# Patient Record
Sex: MALE | Race: WHITE | NOT HISPANIC OR LATINO
[De-identification: names, ages, dates, MRNs, and addresses within clinical notes are randomized per-mention and may not be internally consistent; named-entity substitution may affect disease eponyms.]

---

## 2018-10-09 NOTE — ASU PATIENT PROFILE, ADULT - PMH
Atrial fibrillation    Diabetes  pre diabetic  Hypercholesteremia    Hyperthyroidism    Rheumatoid arthritis

## 2018-10-10 ENCOUNTER — RESULT REVIEW (OUTPATIENT)
Age: 58
End: 2018-10-10

## 2018-10-10 ENCOUNTER — OUTPATIENT (OUTPATIENT)
Dept: OUTPATIENT SERVICES | Facility: HOSPITAL | Age: 58
LOS: 1 days | Discharge: HOME | End: 2018-10-10

## 2018-10-10 VITALS
DIASTOLIC BLOOD PRESSURE: 66 MMHG | SYSTOLIC BLOOD PRESSURE: 130 MMHG | OXYGEN SATURATION: 98 % | HEART RATE: 68 BPM | RESPIRATION RATE: 20 BRPM

## 2018-10-10 VITALS
WEIGHT: 315 LBS | DIASTOLIC BLOOD PRESSURE: 85 MMHG | HEIGHT: 75 IN | TEMPERATURE: 98 F | HEART RATE: 66 BPM | RESPIRATION RATE: 20 BRPM | SYSTOLIC BLOOD PRESSURE: 142 MMHG

## 2018-10-10 DIAGNOSIS — Z98.890 OTHER SPECIFIED POSTPROCEDURAL STATES: Chronic | ICD-10-CM

## 2018-10-10 NOTE — H&P PST ADULT - ASSESSMENT
57 yo M Hx of DM, HTN, DL RA, AFib here for EGD and colonoscopy (last colonoscopy in 2006).  Plan:  EGD  Colonoscopy

## 2018-10-11 LAB
SURGICAL PATHOLOGY STUDY: SIGNIFICANT CHANGE UP
SURGICAL PATHOLOGY STUDY: SIGNIFICANT CHANGE UP

## 2018-10-15 DIAGNOSIS — K57.30 DIVERTICULOSIS OF LARGE INTESTINE WITHOUT PERFORATION OR ABSCESS WITHOUT BLEEDING: ICD-10-CM

## 2018-10-15 DIAGNOSIS — E11.9 TYPE 2 DIABETES MELLITUS WITHOUT COMPLICATIONS: ICD-10-CM

## 2018-10-15 DIAGNOSIS — K22.70 BARRETT'S ESOPHAGUS WITHOUT DYSPLASIA: ICD-10-CM

## 2018-10-15 DIAGNOSIS — Z79.84 LONG TERM (CURRENT) USE OF ORAL HYPOGLYCEMIC DRUGS: ICD-10-CM

## 2018-10-15 DIAGNOSIS — Z12.11 ENCOUNTER FOR SCREENING FOR MALIGNANT NEOPLASM OF COLON: ICD-10-CM

## 2018-10-15 DIAGNOSIS — K29.50 UNSPECIFIED CHRONIC GASTRITIS WITHOUT BLEEDING: ICD-10-CM

## 2018-10-15 DIAGNOSIS — I48.91 UNSPECIFIED ATRIAL FIBRILLATION: ICD-10-CM

## 2018-10-15 DIAGNOSIS — K64.8 OTHER HEMORRHOIDS: ICD-10-CM

## 2018-10-15 DIAGNOSIS — Z79.52 LONG TERM (CURRENT) USE OF SYSTEMIC STEROIDS: ICD-10-CM

## 2018-10-15 DIAGNOSIS — M06.9 RHEUMATOID ARTHRITIS, UNSPECIFIED: ICD-10-CM

## 2018-10-15 DIAGNOSIS — K21.9 GASTRO-ESOPHAGEAL REFLUX DISEASE WITHOUT ESOPHAGITIS: ICD-10-CM

## 2018-10-15 DIAGNOSIS — I10 ESSENTIAL (PRIMARY) HYPERTENSION: ICD-10-CM

## 2018-10-15 DIAGNOSIS — E05.90 THYROTOXICOSIS, UNSPECIFIED WITHOUT THYROTOXIC CRISIS OR STORM: ICD-10-CM

## 2018-10-15 DIAGNOSIS — K20.9 ESOPHAGITIS, UNSPECIFIED: ICD-10-CM

## 2018-10-15 DIAGNOSIS — D12.4 BENIGN NEOPLASM OF DESCENDING COLON: ICD-10-CM

## 2018-10-15 DIAGNOSIS — E78.00 PURE HYPERCHOLESTEROLEMIA, UNSPECIFIED: ICD-10-CM

## 2018-10-15 DIAGNOSIS — K62.1 RECTAL POLYP: ICD-10-CM

## 2018-10-15 DIAGNOSIS — K44.9 DIAPHRAGMATIC HERNIA WITHOUT OBSTRUCTION OR GANGRENE: ICD-10-CM

## 2018-11-11 ENCOUNTER — INPATIENT (INPATIENT)
Facility: HOSPITAL | Age: 58
LOS: 1 days | Discharge: HOME | End: 2018-11-13
Attending: INTERNAL MEDICINE | Admitting: INTERNAL MEDICINE
Payer: COMMERCIAL

## 2018-11-11 VITALS
TEMPERATURE: 97 F | HEART RATE: 67 BPM | OXYGEN SATURATION: 98 % | RESPIRATION RATE: 18 BRPM | SYSTOLIC BLOOD PRESSURE: 145 MMHG | DIASTOLIC BLOOD PRESSURE: 90 MMHG

## 2018-11-11 DIAGNOSIS — E05.90 THYROTOXICOSIS, UNSPECIFIED WITHOUT THYROTOXIC CRISIS OR STORM: ICD-10-CM

## 2018-11-11 DIAGNOSIS — M25.551 PAIN IN RIGHT HIP: ICD-10-CM

## 2018-11-11 DIAGNOSIS — M06.9 RHEUMATOID ARTHRITIS, UNSPECIFIED: ICD-10-CM

## 2018-11-11 DIAGNOSIS — Z98.890 OTHER SPECIFIED POSTPROCEDURAL STATES: Chronic | ICD-10-CM
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
## 2018-11-11 DIAGNOSIS — Z23 ENCOUNTER FOR IMMUNIZATION: ICD-10-CM

## 2018-11-11 DIAGNOSIS — E78.00 PURE HYPERCHOLESTEROLEMIA, UNSPECIFIED: ICD-10-CM

## 2018-11-11 DIAGNOSIS — R73.03 PREDIABETES: ICD-10-CM

## 2018-11-11 DIAGNOSIS — Z87.891 PERSONAL HISTORY OF NICOTINE DEPENDENCE: ICD-10-CM

## 2018-11-11 DIAGNOSIS — M70.61 TROCHANTERIC BURSITIS, RIGHT HIP: ICD-10-CM

## 2018-11-11 DIAGNOSIS — M16.31 UNILATERAL OSTEOARTHRITIS RESULTING FROM HIP DYSPLASIA, RIGHT HIP: ICD-10-CM
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
## 2018-11-11 DIAGNOSIS — I48.0 PAROXYSMAL ATRIAL FIBRILLATION: ICD-10-CM

## 2018-11-11 DIAGNOSIS — M76.01 GLUTEAL TENDINITIS, RIGHT HIP: ICD-10-CM

## 2018-11-11 DIAGNOSIS — S76.011A STRAIN OF MUSCLE, FASCIA AND TENDON OF RIGHT HIP, INITIAL ENCOUNTER: ICD-10-CM

## 2018-11-11 PROBLEM — I48.91 UNSPECIFIED ATRIAL FIBRILLATION: Chronic | Status: ACTIVE | Noted: 2018-10-10

## 2018-11-11 PROBLEM — E11.9 TYPE 2 DIABETES MELLITUS WITHOUT COMPLICATIONS: Chronic | Status: ACTIVE | Noted: 2018-10-10

## 2018-11-11 LAB
ALBUMIN SERPL ELPH-MCNC: 5 G/DL — SIGNIFICANT CHANGE UP (ref 3.5–5.2)
ALP SERPL-CCNC: 45 U/L — SIGNIFICANT CHANGE UP (ref 30–115)
ALT FLD-CCNC: 51 U/L — HIGH (ref 0–41)
ANION GAP SERPL CALC-SCNC: 20 MMOL/L — HIGH (ref 7–14)
AST SERPL-CCNC: 21 U/L — SIGNIFICANT CHANGE UP (ref 0–41)
BILIRUB SERPL-MCNC: 0.8 MG/DL — SIGNIFICANT CHANGE UP (ref 0.2–1.2)
BUN SERPL-MCNC: 18 MG/DL — SIGNIFICANT CHANGE UP (ref 10–20)
CALCIUM SERPL-MCNC: 9.8 MG/DL — SIGNIFICANT CHANGE UP (ref 8.5–10.1)
CHLORIDE SERPL-SCNC: 94 MMOL/L — LOW (ref 98–110)
CO2 SERPL-SCNC: 24 MMOL/L — SIGNIFICANT CHANGE UP (ref 17–32)
CREAT SERPL-MCNC: 0.8 MG/DL — SIGNIFICANT CHANGE UP (ref 0.7–1.5)
GLUCOSE BLDC GLUCOMTR-MCNC: 194 MG/DL — HIGH (ref 70–99)
GLUCOSE SERPL-MCNC: 112 MG/DL — HIGH (ref 70–99)
HCT VFR BLD CALC: 46.1 % — SIGNIFICANT CHANGE UP (ref 42–52)
HGB BLD-MCNC: 16.8 G/DL — SIGNIFICANT CHANGE UP (ref 14–18)
MCHC RBC-ENTMCNC: 34.5 PG — HIGH (ref 27–31)
MCHC RBC-ENTMCNC: 36.4 G/DL — SIGNIFICANT CHANGE UP (ref 32–37)
MCV RBC AUTO: 94.7 FL — HIGH (ref 80–94)
NRBC # BLD: 0 /100 WBCS — SIGNIFICANT CHANGE UP (ref 0–0)
PLATELET # BLD AUTO: 181 K/UL — SIGNIFICANT CHANGE UP (ref 130–400)
POTASSIUM SERPL-MCNC: 3.8 MMOL/L — SIGNIFICANT CHANGE UP (ref 3.5–5)
POTASSIUM SERPL-SCNC: 3.8 MMOL/L — SIGNIFICANT CHANGE UP (ref 3.5–5)
PROT SERPL-MCNC: 7.3 G/DL — SIGNIFICANT CHANGE UP (ref 6–8)
RBC # BLD: 4.87 M/UL — SIGNIFICANT CHANGE UP (ref 4.7–6.1)
RBC # FLD: 14.3 % — SIGNIFICANT CHANGE UP (ref 11.5–14.5)
SODIUM SERPL-SCNC: 138 MMOL/L — SIGNIFICANT CHANGE UP (ref 135–146)
WBC # BLD: 8.95 K/UL — SIGNIFICANT CHANGE UP (ref 4.8–10.8)
WBC # FLD AUTO: 8.95 K/UL — SIGNIFICANT CHANGE UP (ref 4.8–10.8)

## 2018-11-11 PROCEDURE — 93971 EXTREMITY STUDY: CPT | Mod: 26

## 2018-11-11 RX ORDER — BUPROPION HYDROCHLORIDE 150 MG/1
300 TABLET, EXTENDED RELEASE ORAL
Qty: 0 | Refills: 0 | COMMUNITY

## 2018-11-11 RX ORDER — CYCLOBENZAPRINE HYDROCHLORIDE 10 MG/1
10 TABLET, FILM COATED ORAL THREE TIMES A DAY
Qty: 0 | Refills: 0 | Status: DISCONTINUED | OUTPATIENT
Start: 2018-11-11 | End: 2018-11-13

## 2018-11-11 RX ORDER — BUPROPION HYDROCHLORIDE 150 MG/1
300 TABLET, EXTENDED RELEASE ORAL DAILY
Qty: 0 | Refills: 0 | Status: DISCONTINUED | OUTPATIENT
Start: 2018-11-11 | End: 2018-11-13

## 2018-11-11 RX ORDER — CHLORHEXIDINE GLUCONATE 213 G/1000ML
1 SOLUTION TOPICAL
Qty: 0 | Refills: 0 | Status: DISCONTINUED | OUTPATIENT
Start: 2018-11-11 | End: 2018-11-13

## 2018-11-11 RX ORDER — TOCILIZUMAB 20 MG/ML
0 INJECTION, SOLUTION, CONCENTRATE INTRAVENOUS
Qty: 0 | Refills: 0 | COMMUNITY

## 2018-11-11 RX ORDER — FOLIC ACID 0.8 MG
1 TABLET ORAL
Qty: 0 | Refills: 0 | Status: DISCONTINUED | OUTPATIENT
Start: 2018-11-11 | End: 2018-11-13

## 2018-11-11 RX ORDER — FOLIC ACID 0.8 MG
1 TABLET ORAL
Qty: 0 | Refills: 0 | COMMUNITY

## 2018-11-11 RX ORDER — EMPAGLIFLOZIN, METFORMIN HYDROCHLORIDE 10; 1000 MG/1; MG/1
1 TABLET, EXTENDED RELEASE ORAL
Qty: 0 | Refills: 0 | COMMUNITY

## 2018-11-11 RX ORDER — ENOXAPARIN SODIUM 100 MG/ML
40 INJECTION SUBCUTANEOUS EVERY 24 HOURS
Qty: 0 | Refills: 0 | Status: DISCONTINUED | OUTPATIENT
Start: 2018-11-11 | End: 2018-11-13

## 2018-11-11 RX ORDER — TIZANIDINE 4 MG/1
1 TABLET ORAL
Qty: 21 | Refills: 0 | OUTPATIENT
Start: 2018-11-11 | End: 2018-11-17

## 2018-11-11 RX ORDER — ESOMEPRAZOLE MAGNESIUM 40 MG/1
0 CAPSULE, DELAYED RELEASE ORAL
Qty: 0 | Refills: 0 | COMMUNITY

## 2018-11-11 RX ORDER — OXYCODONE AND ACETAMINOPHEN 5; 325 MG/1; MG/1
1 TABLET ORAL ONCE
Qty: 0 | Refills: 0 | Status: DISCONTINUED | OUTPATIENT
Start: 2018-11-11 | End: 2018-11-11

## 2018-11-11 RX ORDER — LEVOTHYROXINE SODIUM 125 MCG
75 TABLET ORAL DAILY
Qty: 0 | Refills: 0 | Status: DISCONTINUED | OUTPATIENT
Start: 2018-11-11 | End: 2018-11-13

## 2018-11-11 RX ORDER — LEVOTHYROXINE SODIUM 125 MCG
1 TABLET ORAL
Qty: 0 | Refills: 0 | COMMUNITY

## 2018-11-11 RX ORDER — ATORVASTATIN CALCIUM 80 MG/1
0 TABLET, FILM COATED ORAL
Qty: 0 | Refills: 0 | COMMUNITY

## 2018-11-11 RX ORDER — INFLUENZA VIRUS VACCINE 15; 15; 15; 15 UG/.5ML; UG/.5ML; UG/.5ML; UG/.5ML
0.5 SUSPENSION INTRAMUSCULAR ONCE
Qty: 0 | Refills: 0 | Status: COMPLETED | OUTPATIENT
Start: 2018-11-11 | End: 2018-11-13

## 2018-11-11 RX ORDER — IBUPROFEN 200 MG
0 TABLET ORAL
Qty: 0 | Refills: 0 | COMMUNITY

## 2018-11-11 RX ORDER — KETOROLAC TROMETHAMINE 30 MG/ML
15 SYRINGE (ML) INJECTION EVERY 8 HOURS
Qty: 0 | Refills: 0 | Status: DISCONTINUED | OUTPATIENT
Start: 2018-11-11 | End: 2018-11-13

## 2018-11-11 RX ORDER — DILTIAZEM HCL 120 MG
360 CAPSULE, EXT RELEASE 24 HR ORAL DAILY
Qty: 0 | Refills: 0 | Status: DISCONTINUED | OUTPATIENT
Start: 2018-11-12 | End: 2018-11-13

## 2018-11-11 RX ORDER — KETOROLAC TROMETHAMINE 30 MG/ML
15 SYRINGE (ML) INJECTION ONCE
Qty: 0 | Refills: 0 | Status: DISCONTINUED | OUTPATIENT
Start: 2018-11-11 | End: 2018-11-11

## 2018-11-11 RX ORDER — METHOTREXATE 2.5 MG/1
0 TABLET ORAL
Qty: 0 | Refills: 0 | COMMUNITY

## 2018-11-11 RX ORDER — IBUPROFEN 200 MG
800 TABLET ORAL ONCE
Qty: 0 | Refills: 0 | Status: DISCONTINUED | OUTPATIENT
Start: 2018-11-11 | End: 2018-11-11

## 2018-11-11 RX ORDER — ATORVASTATIN CALCIUM 80 MG/1
20 TABLET, FILM COATED ORAL AT BEDTIME
Qty: 0 | Refills: 0 | Status: DISCONTINUED | OUTPATIENT
Start: 2018-11-11 | End: 2018-11-13

## 2018-11-11 RX ORDER — PANTOPRAZOLE SODIUM 20 MG/1
40 TABLET, DELAYED RELEASE ORAL
Qty: 0 | Refills: 0 | Status: DISCONTINUED | OUTPATIENT
Start: 2018-11-11 | End: 2018-11-13

## 2018-11-11 RX ORDER — METHOCARBAMOL 500 MG/1
1000 TABLET, FILM COATED ORAL ONCE
Qty: 0 | Refills: 0 | Status: COMPLETED | OUTPATIENT
Start: 2018-11-11 | End: 2018-11-11

## 2018-11-11 RX ORDER — IBUPROFEN 200 MG
1 TABLET ORAL
Qty: 21 | Refills: 0 | OUTPATIENT
Start: 2018-11-11 | End: 2018-11-17

## 2018-11-11 RX ADMIN — OXYCODONE AND ACETAMINOPHEN 1 TABLET(S): 5; 325 TABLET ORAL at 15:37

## 2018-11-11 RX ADMIN — Medication 15 MILLIGRAM(S): at 18:15

## 2018-11-11 RX ADMIN — Medication 15 MILLIGRAM(S): at 22:14

## 2018-11-11 RX ADMIN — METHOCARBAMOL 1000 MILLIGRAM(S): 500 TABLET, FILM COATED ORAL at 15:37

## 2018-11-11 RX ADMIN — Medication 15 MILLIGRAM(S): at 23:00

## 2018-11-11 NOTE — H&P ADULT - HISTORY OF PRESENT ILLNESS
The patient is a 58-year-old male with a PMH of HTN, type II DM, DLD, A-Fib, RA, hyperthyroidism who presented The patient is a 58-year-old male with a PMH of HTN, prediabetes, DLD, A-Fib (no AC), RA, and hyperthyroidism who presented with a 1-day history of right groin pain.  He reports the pain started suddenly while he was trying to walk.  He described the pain as a stabbing sensation and denied any radiation of pain.  He was able to ambulate despite experiencing pain when trying to do so.  Prior to onset of pain, the patient reports visiting a wooded area but denied sustaining any trauma.  Otherwise, he denied fever, chills, fall, abdominal pain, difficulty urinating or with bowel movement.  In the ED he was treated with pain medications and muscle relaxers.  However, he was unable to ambulate secondary to severe pain on lateral motion of the right leg. The patient is a 58-year-old male with a PMH of prediabetes, DLD, A-Fib (no AC), RA, and hyperthyroidism s/p GORDON who presented with a 1-day history of right groin pain.  He reports the pain started suddenly while he was trying to walk.  He described the pain as a stabbing sensation and denied any radiation of pain.  He was able to ambulate despite experiencing pain when trying to do so.  Prior to onset of pain, the patient reports visiting a wooded area but denied sustaining any trauma.  Otherwise, he denied fever, chills, fall, abdominal pain, difficulty urinating or with bowel movement.  In the ED he was treated with pain medications and muscle relaxers.  However, he was unable to ambulate secondary to severe pain on lateral motion of the right leg.

## 2018-11-11 NOTE — ED PROVIDER NOTE - CARE PLAN
Principal Discharge DX:	Groin strain Principal Discharge DX:	Groin strain  Secondary Diagnosis:	Poor tolerance for ambulation

## 2018-11-11 NOTE — ED PROVIDER NOTE - CARE PROVIDER_API CALL
George Rodriguez (MD), Orthopaedic Surgery  [facility] [place] [address]  Phone: [phone]  Fax: [phone]

## 2018-11-11 NOTE — ED PROVIDER NOTE - ATTENDING CONTRIBUTION TO CARE
The patient is a 58-year-old male with a PMH of prediabetes, DLD, A-Fib (no AC), RA, and hyperthyroidism s/p GORDON here for eval right groin pain. pt  reports 1 day of right groin pain. pain noticed yesterday. over the weekend the pt reports that he was [place]. this however is a usual activity for pt. pain is not associated with testicular pain, urinary sx's, abdominal pain, fever, chills. The pt denies any falls or misstep. Yesterday while driving home, pt had to lift his leg up by pulling on his pants since he had so much pain.   on exam pt no distress, [id number] soft nt/nd, ttp right groin, no hip ttp, pain with minimal rom right hip DNVI. impression right groin pain, normal testicular exam by PA, pt in ed eating meatball sandwhich with no abd ttp, dvt study prelim neg, xray with no fracture. after mult meds pt unable to ambulate in ed. will admit

## 2018-11-11 NOTE — ED PROVIDER NOTE - NS ED ROS FT
Constitutional: (-) fever, (-) chills, (-) recent illness,   Eyes: (-) pain  Cardiovascular: (-) chest pain, (-) syncope  Respiratory: (-) cough, (-) shortness of breath, (-) dyspnea,   Gastrointestinal: (-) vomiting, (-) diarrhea, (-)nausea,   Musculoskeletal: (-) neck pain, (-) back pain, (-) joint pain,(+) right groin pain (+)limitation of movement  Integumentary: (-) rash, (-) edema, (-) bruises, (-)color changes, (-)lumps,   Neurological: (-) headache,, (-) dizziness, (-) tingling, (-)numbness  Peripheral Vascular: (-) leg swelling, (-) color changes  : (-)frequency, (-)dysuria,  (-) hematuria, (-) testicular pain  All: (-) pruritis

## 2018-11-11 NOTE — H&P ADULT - NSHPPHYSICALEXAM_GEN_ALL_CORE
Vital Signs Last 24 Hrs  T(C): 36.4 (11 Nov 2018 15:51), Max: 36.4 (11 Nov 2018 15:51)  T(F): 97.5 (11 Nov 2018 15:51), Max: 97.5 (11 Nov 2018 15:51)  HR: 91 (11 Nov 2018 15:51) (67 - 91)  BP: 132/66 (11 Nov 2018 15:51) (132/66 - 145/90)  BP(mean): --  RR: 18 (11 Nov 2018 15:51) (18 - 18)  SpO2: 98% (11 Nov 2018 14:41) (98% - 98%)    Physical Exam:    -     General :     -      HEENT:    -      Cardiac:    -      Pulm:    -      GI:    -      Musculoskeletal:    -      Neuro: Vital Signs Last 24 Hrs  T(C): 36.4 (11 Nov 2018 15:51), Max: 36.4 (11 Nov 2018 15:51)  T(F): 97.5 (11 Nov 2018 15:51), Max: 97.5 (11 Nov 2018 15:51)  HR: 91 (11 Nov 2018 15:51) (67 - 91)  BP: 132/66 (11 Nov 2018 15:51) (132/66 - 145/90)  BP(mean): --  RR: 18 (11 Nov 2018 15:51) (18 - 18)  SpO2: 98% (11 Nov 2018 14:41) (98% - 98%)    Physical Exam:    -     General : Alert, awake and in mild-to-moderate painful distress when moving RLE    -      HEENT: normocephalic    -      Cardiac: RRR, normal S1/S2    -      Pulm: CTA B/L    -      GI: abdomen soft, non-tender    -      Musculoskeletal: mild tenderness over right groin area without erythema; lateral ROM of RLE limited secondary to pain    -      Neuro: AAO x3

## 2018-11-11 NOTE — ED PROVIDER NOTE - NSFOLLOWUPCLINICS_GEN_ALL_ED_FT
[facility]  Rehabilitation  [address] [place] [address]  Phone: [phone]  Fax:   Follow Up Time:

## 2018-11-11 NOTE — H&P ADULT - ASSESSMENT
Assessment:  A 58y Male     Plan:  1. Assessment:  A 58y Male with a PMH of prediabetes, DLD, A-Fib (no AC), RA, and hyperthyroidism s/p GORDON who presented with a 1-day history of right groin pain.     Plan:  1. Right groin pain  - DDx include muscle/tendon strain vs. trochanteric bursitis vs. hip OA  - x-ray of hip: No evidence of acute fracture or dislocation. Mild degenerative changes of the right hip.   - LE venous duplex reportedly negative for DVT; f/u official result  - continue bedrest for now; apply ice to affected area  - continue pain control as tolerated  - PT/rehab consult pending  - consider orthopedics evaluation for further evaluation    2. Prediabetes  - hold oral hypoglycemic medications  - monitor FS and start insulin if FS persistently >180    3. Paroxysmal A-Fib  - patient denies being on anticoagulants or antiplatelets  - continue rate control    4. DLD  - continue statin    5. RA  - patient on daily Prednisone and weekly MTX and Actembra  - continue Folic acid  - f/u with rheumatology as outpatient    6. Hyperthyroidism s/p GORDON  - continue Synthroid    7. DVT and GI prophylaxis  - LMWH and PPI    8. Disposition  - anticipate discharge home once medically stable

## 2018-11-11 NOTE — ED ADULT NURSE NOTE - NSIMPLEMENTINTERV_GEN_ALL_ED
Implemented All Fall with Harm Risk Interventions:  [place] to call system. Call bell, personal items and telephone within reach. Instruct patient to call for assistance. Room bathroom lighting operational. Non-slip footwear when patient is off stretcher. Physically safe environment: no spills, clutter or unnecessary equipment. Stretcher in lowest position, wheels locked, appropriate side rails in place. Provide visual cue, wrist band, yellow gown, etc. Monitor gait and stability. Monitor for mental status changes and reorient to person, place, and time. Review medications for side effects contributing to fall risk. Reinforce activity limits and safety measures with patient and family. Provide visual clues: red socks.

## 2018-11-11 NOTE — ED PROVIDER NOTE - MEDICAL DECISION MAKING DETAILS
impression right groin pain, normal testicular exam by PA, pt in ed eating meatball sandwhich with no abd ttp, dvt study prelim neg, xray with no fracture. after mult meds pt unable to ambulate in ed. will admit

## 2018-11-11 NOTE — ED PROVIDER NOTE - PHYSICAL EXAMINATION
Physical Exam    Vital Signs: I have reviewed the initial vital signs.  Constitutional: well-nourished, obese male, appears stated age, no acute distress  Cardiovascular: S1 and S2, regular rate, regular rhythm, well-perfused extremities, radial pulses equal and 2+  Respiratory: unlabored respiratory effort, clear to auscultation bilaterally no wheezing, rales and rhonchi  Gastrointestinal: soft, non-tender abdomen, no pulsatile mass, normal bowl sounds  Musculoskeletal: supple neck, no lower extremity edema, no midline tenderness. TTP in right groin, no hernia appreciated   Integumentary: warm, dry, no rash  Neurologic: awake, alert, cranial nerves II-XII grossly intact, extremities’ motor and sensory functions grossly intact  : Chaperoned with RN Agustín; normal testes, no pain to palpitation of testes, no hernia appreciated in inguinal canal.

## 2018-11-11 NOTE — ED PROVIDER NOTE - OBJECTIVE STATEMENT
59 yo male, PMH RA, HTN, afib no a/c or antiplt use, hyperthyroidism, presents to the ED for evaluation of right groin pain. Pt reports was hunting yesterday in [facility] and last night felt right groin pain. Pt reports that pain is made worse with movement, has taken motrin at home 4 hours PTA without relief, and pain does not radiate anywhere. At this time denies fever, chills, falls/trauma, chest pain, SOB, N/V/D, joint pain, back pain, neck pain, lower extremity swelling or pain, HA, numbness, tingling, dysuria, hematuria, flank pain, or testicular pain.

## 2018-11-11 NOTE — H&P ADULT - FAMILY HISTORY
Mother  Still living? Unknown  Family history of rheumatoid arthritis, Age at diagnosis: Age Unknown     Father  Still living? Unknown  Family history of coronary artery disease, Age at diagnosis: Age Unknown

## 2018-11-11 NOTE — ED ADULT NURSE NOTE - OBJECTIVE STATEMENT
Pt 57 y/o male c/o right hip pain, pt states it is painful at rest but extremely painful when moving.

## 2018-11-11 NOTE — H&P ADULT - ATTENDING COMMENTS
58y Male with a PMH of prediabetes, DLD, A-Fib (no AC), RA, and hyperthyroidism s/p GORDON who presented with a 1-day history of right groin pain.     Pt seen and examined- motion limited due to pain    Chart reviewed- agree with initial plan    orthopedics eval- to decide on MRI vs CT for better imaging    PT eval    analgesia prn    DVT proph

## 2018-11-11 NOTE — H&P ADULT - NSHPLABSRESULTS_GEN_ALL_CORE
Labs:                        16.8   8.95  )-----------( 181      ( 11 Nov 2018 19:20 )             46.1             11-11    138  |  94<L>  |  18  ----------------------------<  112<H>  3.8   |  24  |  0.8    Ca    9.8      11 Nov 2018 19:20    TPro  7.3  /  Alb  5.0  /  TBili  0.8  /  DBili  x   /  AST  21  /  ALT  51<H>  /  AlkPhos  45  11-11    LIVER FUNCTIONS - ( 11 Nov 2018 19:20 )  Alb: 5.0 g/dL / Pro: 7.3 g/dL / ALK PHOS: 45 U/L / ALT: 51 U/L / AST: 21 U/L / GGT: x

## 2018-11-11 NOTE — ED PROVIDER NOTE - PROGRESS NOTE DETAILS
Prelim read from vasc states negative for dvt On reassessment pt states in slightly less pain but any movement of right leg causes increase in pain. I have personally evaluated the patient myself and agree with fellow's plan. Results d/w patient and family and copies of results provided.  Pt instructed to return if any worsening symptoms or concerns.  They verbalize understanding. Pt is unable to ambulate due to pain. .pain with movement. no other symptoms. Pt unable to ambulate. WIll reassess labs (cbc cmp) and admit for inability to ambulate.

## 2018-11-12 LAB
ANION GAP SERPL CALC-SCNC: 19 MMOL/L — HIGH (ref 7–14)
BUN SERPL-MCNC: 21 MG/DL — HIGH (ref 10–20)
CALCIUM SERPL-MCNC: 9 MG/DL — SIGNIFICANT CHANGE UP (ref 8.5–10.1)
CHLORIDE SERPL-SCNC: 99 MMOL/L — SIGNIFICANT CHANGE UP (ref 98–110)
CO2 SERPL-SCNC: 23 MMOL/L — SIGNIFICANT CHANGE UP (ref 17–32)
CREAT SERPL-MCNC: 0.8 MG/DL — SIGNIFICANT CHANGE UP (ref 0.7–1.5)
GLUCOSE BLDC GLUCOMTR-MCNC: 149 MG/DL — HIGH (ref 70–99)
GLUCOSE BLDC GLUCOMTR-MCNC: 156 MG/DL — HIGH (ref 70–99)
GLUCOSE BLDC GLUCOMTR-MCNC: 204 MG/DL — HIGH (ref 70–99)
GLUCOSE BLDC GLUCOMTR-MCNC: 238 MG/DL — HIGH (ref 70–99)
GLUCOSE SERPL-MCNC: 133 MG/DL — HIGH (ref 70–99)
HCT VFR BLD CALC: 43.1 % — SIGNIFICANT CHANGE UP (ref 42–52)
HGB BLD-MCNC: 15 G/DL — SIGNIFICANT CHANGE UP (ref 14–18)
MAGNESIUM SERPL-MCNC: 2.1 MG/DL — SIGNIFICANT CHANGE UP (ref 1.8–2.4)
MCHC RBC-ENTMCNC: 33.3 PG — HIGH (ref 27–31)
MCHC RBC-ENTMCNC: 34.8 G/DL — SIGNIFICANT CHANGE UP (ref 32–37)
MCV RBC AUTO: 95.8 FL — HIGH (ref 80–94)
NRBC # BLD: 0 /100 WBCS — SIGNIFICANT CHANGE UP (ref 0–0)
PLATELET # BLD AUTO: 141 K/UL — SIGNIFICANT CHANGE UP (ref 130–400)
POTASSIUM SERPL-MCNC: 3.9 MMOL/L — SIGNIFICANT CHANGE UP (ref 3.5–5)
POTASSIUM SERPL-SCNC: 3.9 MMOL/L — SIGNIFICANT CHANGE UP (ref 3.5–5)
RBC # BLD: 4.5 M/UL — LOW (ref 4.7–6.1)
RBC # FLD: 14.4 % — SIGNIFICANT CHANGE UP (ref 11.5–14.5)
SODIUM SERPL-SCNC: 141 MMOL/L — SIGNIFICANT CHANGE UP (ref 135–146)
WBC # BLD: 6.09 K/UL — SIGNIFICANT CHANGE UP (ref 4.8–10.8)
WBC # FLD AUTO: 6.09 K/UL — SIGNIFICANT CHANGE UP (ref 4.8–10.8)

## 2018-11-12 RX ORDER — INSULIN GLARGINE 100 [IU]/ML
15 INJECTION, SOLUTION SUBCUTANEOUS AT BEDTIME
Qty: 0 | Refills: 0 | Status: DISCONTINUED | OUTPATIENT
Start: 2018-11-12 | End: 2018-11-13

## 2018-11-12 RX ORDER — INSULIN LISPRO 100/ML
VIAL (ML) SUBCUTANEOUS
Qty: 0 | Refills: 0 | Status: DISCONTINUED | OUTPATIENT
Start: 2018-11-12 | End: 2018-11-13

## 2018-11-12 RX ORDER — SODIUM CHLORIDE 9 MG/ML
1000 INJECTION, SOLUTION INTRAVENOUS
Qty: 0 | Refills: 0 | Status: DISCONTINUED | OUTPATIENT
Start: 2018-11-12 | End: 2018-11-13

## 2018-11-12 RX ORDER — INSULIN LISPRO 100/ML
5 VIAL (ML) SUBCUTANEOUS
Qty: 0 | Refills: 0 | Status: DISCONTINUED | OUTPATIENT
Start: 2018-11-12 | End: 2018-11-13

## 2018-11-12 RX ORDER — DEXTROSE 50 % IN WATER 50 %
25 SYRINGE (ML) INTRAVENOUS ONCE
Qty: 0 | Refills: 0 | Status: DISCONTINUED | OUTPATIENT
Start: 2018-11-12 | End: 2018-11-13

## 2018-11-12 RX ORDER — DEXTROSE 50 % IN WATER 50 %
12.5 SYRINGE (ML) INTRAVENOUS ONCE
Qty: 0 | Refills: 0 | Status: DISCONTINUED | OUTPATIENT
Start: 2018-11-12 | End: 2018-11-13

## 2018-11-12 RX ORDER — GLUCAGON INJECTION, SOLUTION 0.5 MG/.1ML
1 INJECTION, SOLUTION SUBCUTANEOUS ONCE
Qty: 0 | Refills: 0 | Status: DISCONTINUED | OUTPATIENT
Start: 2018-11-12 | End: 2018-11-13

## 2018-11-12 RX ORDER — DEXTROSE 50 % IN WATER 50 %
15 SYRINGE (ML) INTRAVENOUS ONCE
Qty: 0 | Refills: 0 | Status: DISCONTINUED | OUTPATIENT
Start: 2018-11-12 | End: 2018-11-13

## 2018-11-12 RX ADMIN — Medication 15 MILLIGRAM(S): at 21:47

## 2018-11-12 RX ADMIN — Medication 15 MILLIGRAM(S): at 15:17

## 2018-11-12 RX ADMIN — PANTOPRAZOLE SODIUM 40 MILLIGRAM(S): 20 TABLET, DELAYED RELEASE ORAL at 05:32

## 2018-11-12 RX ADMIN — Medication 5 MILLIGRAM(S): at 05:33

## 2018-11-12 RX ADMIN — ENOXAPARIN SODIUM 40 MILLIGRAM(S): 100 INJECTION SUBCUTANEOUS at 05:33

## 2018-11-12 RX ADMIN — Medication 1 MILLIGRAM(S): at 05:32

## 2018-11-12 RX ADMIN — CHLORHEXIDINE GLUCONATE 1 APPLICATION(S): 213 SOLUTION TOPICAL at 05:34

## 2018-11-12 RX ADMIN — INSULIN GLARGINE 15 UNIT(S): 100 INJECTION, SOLUTION SUBCUTANEOUS at 21:43

## 2018-11-12 RX ADMIN — Medication 75 MICROGRAM(S): at 05:32

## 2018-11-12 RX ADMIN — Medication 15 MILLIGRAM(S): at 13:01

## 2018-11-12 RX ADMIN — BUPROPION HYDROCHLORIDE 300 MILLIGRAM(S): 150 TABLET, EXTENDED RELEASE ORAL at 11:50

## 2018-11-12 RX ADMIN — Medication 15 MILLIGRAM(S): at 05:41

## 2018-11-12 RX ADMIN — ATORVASTATIN CALCIUM 20 MILLIGRAM(S): 80 TABLET, FILM COATED ORAL at 21:43

## 2018-11-12 RX ADMIN — CYCLOBENZAPRINE HYDROCHLORIDE 10 MILLIGRAM(S): 10 TABLET, FILM COATED ORAL at 02:45

## 2018-11-12 RX ADMIN — Medication 360 MILLIGRAM(S): at 05:33

## 2018-11-12 NOTE — CONSULT NOTE ADULT - SUBJECTIVE AND OBJECTIVE BOX
HPI:  The patient is a 58-year-old male with a PMH of prediabetes, DLD, A-Fib (no AC), RA, and hyperthyroidism s/p GORDON who presented with a 1-day history of right groin pain.  He reports the pain started suddenly while he was trying to walk.  He described the pain as a stabbing sensation and denied any radiation of pain.  Initially he was able to ambulate despite experiencing pain when trying to do so, however the pain progressed and was unable to ambulate.   Prior to onset of pain, the patient reports visiting a wooded area but denied sustaining any trauma or slipping in the mud.   Otherwise, he denied fever, chills, fall, abdominal pain, difficulty urinating or with bowel movement.  In the ED he was treated with pain medications and muscle relaxers.  However, he was unable to ambulate secondary to severe pain on lateral motion of the right leg. He describes the pain as being localized to the groin, there is trochanteric pain. The pain is better with Toradol.     PAST MEDICAL & SURGICAL HISTORY:  Diabetes: pre diabetic  Rheumatoid arthritis  Hyperthyroidism  Hypercholesteremia  Atrial fibrillation  H/O colonoscopy: 2006  History of umbilical hernia repair    MEDICATIONS  (STANDING):  atorvastatin 20 milliGRAM(s) Oral at bedtime  buPROPion XL . 300 milliGRAM(s) Oral daily  chlorhexidine 4% Liquid 1 Application(s) Topical <User Schedule>  dextrose 5%. 1000 milliLiter(s) (50 mL/Hr) IV Continuous <Continuous>  dextrose 50% Injectable 12.5 Gram(s) IV Push once  dextrose 50% Injectable 25 Gram(s) IV Push once  dextrose 50% Injectable 25 Gram(s) IV Push once  diltiazem    milliGRAM(s) Oral daily  enoxaparin Injectable 40 milliGRAM(s) SubCutaneous every 24 hours  folic acid 1 milliGRAM(s) Oral <User Schedule>  influenza   Vaccine 0.5 milliLiter(s) IntraMuscular once  insulin glargine Injectable (LANTUS) 15 Unit(s) SubCutaneous at bedtime  insulin lispro (HumaLOG) corrective regimen sliding scale   SubCutaneous three times a day before meals  insulin lispro Injectable (HumaLOG) 5 Unit(s) SubCutaneous three times a day before meals  levothyroxine 75 MICROGram(s) Oral daily  pantoprazole    Tablet 40 milliGRAM(s) Oral before breakfast  predniSONE   Tablet 5 milliGRAM(s) Oral daily    MEDICATIONS  (PRN):  cyclobenzaprine 10 milliGRAM(s) Oral three times a day PRN Muscle Spasm  dextrose 40% Gel 15 Gram(s) Oral once PRN Blood Glucose LESS THAN 70 milliGRAM(s)/deciliter  glucagon  Injectable 1 milliGRAM(s) IntraMuscular once PRN Glucose LESS THAN 70 milligrams/deciliter  ketorolac   Injectable 15 milliGRAM(s) IV Push every 8 hours PRN Severe Pain (7 - 10)      Vital Signs Last 24 Hrs  T(C): 36.1 (12 Nov 2018 07:30), Max: 36.6 (11 Nov 2018 22:08)  T(F): 96.9 (12 Nov 2018 07:30), Max: 97.9 (11 Nov 2018 22:08)  HR: 71 (12 Nov 2018 07:30) (71 - 79)  BP: 133/78 (12 Nov 2018 07:30) (112/54 - 146/82)  BP(mean): --  RR: 19 (12 Nov 2018 07:30) (18 - 19)  SpO2: 95% (11 Nov 2018 21:25) (95% - 95%)                          15.0   6.09  )-----------( 141      ( 12 Nov 2018 05:34 )             43.1     11-12    141  |  99  |  21<H>  ----------------------------<  133<H>  3.9   |  23  |  0.8    Ca    9.0      12 Nov 2018 05:34  Mg     2.1     11-12    TPro  7.3  /  Alb  5.0  /  TBili  0.8  /  DBili  x   /  AST  21  /  ALT  51<H>  /  AlkPhos  45  11-11      < from: Xray Hip 2-3 Views, Right (11.11.18 @ 16:18) >    EXAM:  XR HIP 2-3V RT            PROCEDURE DATE:  11/11/2018      INTERPRETATION:  Clinical History / Reason for exam:  Right hip pain.    Technique: 3 views of the right hip.    Comparison: None.    Findings/  Impression:    No evidence of acute fracture or dislocation. Mild degenerative changes   of the right hip. 2 mm radiodense fragment is noted to overlie the right   medial thigh soft tissues, which may be external to the patient.       ESTEFANI LEE M.D., RESIDENT RADIOLOGIST  This document has been electronically signed.  PROMISE IVRGEN M.D., ATTENDING RADIOLOGIST        This document has been electronically signed. Nov 11 2018  5:34PM    < end of copied text >    PHYSICAL EXAM:    The patient is awake, alert and cooperative, in NAD.     RLE: no tenderness to palpation over the greater troch area, some ttp over groin area. Unable to SLR on his own secondary to the pain, can passively raise leg with endpoint secondary to pain, + groin pain on log roll, no appreciable pain on axial loading. no knee swelling, FROM of the knee, calves soft nttp, nvi       A/P: Acute onset of right groin pain , xrays read as negative, patient reports getting CT scan however no report available yet and unable to view images secondary to PACS problem. The patient is feeling better on NSAIDs. Will discuss with attending and follow up the CT scan.
HPI:  The patient is a 58-year-old male with a PMH of prediabetes, DLD, A-Fib (no AC), RA, and hyperthyroidism s/p GORDON who presented with a 1-day history of right groin pain.  He reports the pain started suddenly while he was trying to walk.  He described the pain as a stabbing sensation and denied any radiation of pain.  He was able to ambulate despite experiencing pain when trying to do so.  Prior to onset of pain, the patient reports visiting a wooded area but denied sustaining any trauma.  Otherwise, he denied fever, chills, fall, abdominal pain, difficulty urinating or with bowel movement.  In the ED he was treated with pain medications and muscle relaxers.  However, he was unable to ambulate secondary to severe pain on lateral motion of the right leg. (11 Nov 2018 20:26). xray negative for fx.      PAST MEDICAL & SURGICAL HISTORY:  Diabetes: pre diabetic  Rheumatoid arthritis  Hyperthyroidism  Hypercholesteremia  Atrial fibrillation  H/O colonoscopy: 2006  History of umbilical hernia repair      Hospital Course:    TODAY'S SUBJECTIVE & REVIEW OF SYMPTOMS:     Constitutional WNL   Cardio WNL   Resp WNL   GI WNL  Heme WNL  Endo WNL  Skin WNL  MSK right groin pain  Neuro WNL  Cognitive WNL  Psych WNL      MEDICATIONS  (STANDING):  atorvastatin 20 milliGRAM(s) Oral at bedtime  buPROPion XL . 300 milliGRAM(s) Oral daily  chlorhexidine 4% Liquid 1 Application(s) Topical <User Schedule>  diltiazem    milliGRAM(s) Oral daily  enoxaparin Injectable 40 milliGRAM(s) SubCutaneous every 24 hours  folic acid 1 milliGRAM(s) Oral <User Schedule>  influenza   Vaccine 0.5 milliLiter(s) IntraMuscular once  levothyroxine 75 MICROGram(s) Oral daily  pantoprazole    Tablet 40 milliGRAM(s) Oral before breakfast  predniSONE   Tablet 5 milliGRAM(s) Oral daily    MEDICATIONS  (PRN):  cyclobenzaprine 10 milliGRAM(s) Oral three times a day PRN Muscle Spasm  ketorolac   Injectable 15 milliGRAM(s) IV Push every 8 hours PRN Severe Pain (7 - 10)      FAMILY HISTORY:  Family history of coronary artery disease (Father)  Family history of rheumatoid arthritis (Mother)      Allergies    No Known Allergies    Intolerances        SOCIAL HISTORY:    [  ] Etoh  [  ] Smoking  [  ] Substance abuse     Home Environment:  [  ] Home Alone  [ x ] Lives with Family  [  ] Home Health Aid    Dwelling:  [  ] Apartment  [x  ] Private House  [  ] Adult Home  [  ] Skilled Nursing Facility      [  ] Short Term  [  ] Long Term  [x  ] Stairs       Elevator [  ]    FUNCTIONAL STATUS PTA: (Check all that apply)  Ambulation: [ xx  ]Independent    [  ] Dependent     [  ] Non-Ambulatory  Assistive Device: [  ] SA Cane  [  ]  Q Cane  [  ] Walker  [  ]  Wheelchair  ADL : [x  ] Independent  [  ]  Dependent       Vital Signs Last 24 Hrs  T(C): 36.1 (12 Nov 2018 07:30), Max: 36.6 (11 Nov 2018 22:08)  T(F): 96.9 (12 Nov 2018 07:30), Max: 97.9 (11 Nov 2018 22:08)  HR: 71 (12 Nov 2018 07:30) (67 - 91)  BP: 133/78 (12 Nov 2018 07:30) (112/54 - 146/82)  BP(mean): --  RR: 19 (12 Nov 2018 07:30) (18 - 19)  SpO2: 95% (11 Nov 2018 21:25) (95% - 98%)      PHYSICAL EXAM: Alert & Oriented X3  GENERAL: NAD, well-groomed, well-developed  HEAD:  Atraumatic, Normocephalic  CHEST/LUNG: Clear   HEART: S1S2+  ABDOMEN: Soft, Nontender  EXTREMITIES:  no calf tenderness    NERVOUS SYSTEM:  Cranial Nerves 2-12 intact [  ] Abnormal  [  ]  ROM: WFL all extremities [  ]  Abnormal [x  ]limited rle  Motor Strength: WFL all extremities  [  ]  Abnormal [x  ]limited rle  Sensation: intact to light touch [x  ] Abnormal [  ]  Reflexes: Symmetric [  ]  Abnormal [  ]    FUNCTIONAL STATUS:  Bed Mobility: Independent [  ]  Supervision [  ]  Needs Assistance [ x ]  N/A [  ]  Transfers: Independent [  ]  Supervision [  ]  Needs Assistance [ x ]  N/A [  ]   Ambulation: Independent [  ]  Supervision [  ]  Needs Assistance [x  ]  N/A [  ]  ADL: Independent [  ] Requires Assistance [  ] N/A [  ]      LABS:                        15.0   6.09  )-----------( 141      ( 12 Nov 2018 05:34 )             43.1     11-12    141  |  99  |  21<H>  ----------------------------<  133<H>  3.9   |  23  |  0.8    Ca    9.0      12 Nov 2018 05:34  Mg     2.1     11-12    TPro  7.3  /  Alb  5.0  /  TBili  0.8  /  DBili  x   /  AST  21  /  ALT  51<H>  /  AlkPhos  45  11-11          RADIOLOGY & ADDITIONAL STUDIES:    Assesment:

## 2018-11-12 NOTE — PROGRESS NOTE ADULT - ASSESSMENT
________________________________________________________________________________  DAILY PROGRESS NOTE:    ================== SUBJECTIVE ==================    NIKOLAY CLEVELAND  /   58y  /  Male  /  MRN#: [id number]  Patient is a 58y old Male who presents with a chief complaint of Right groin pain (12 Nov 2018 09:32)  Currently admitted to medicine with the primary diagnosis of Groin strain      HOSPITAL DAY: 1d     OVERNIGHT EVENTS: None    TODAY: Patient was seen this morning at bedside. Currently, the patient reports no complaints     Review of systems is otherwise negative.    =================== OBJECTIVE ===================    VITAL SIGNS: Last 24 Hours  T(C): 36.1 (12 Nov 2018 07:30), Max: 36.6 (11 Nov 2018 22:08)  T(F): 96.9 (12 Nov 2018 07:30), Max: 97.9 (11 Nov 2018 22:08)  HR: 71 (12 Nov 2018 07:30) (67 - 91)  BP: 133/78 (12 Nov 2018 07:30) (112/54 - 146/82)  BP(mean): --  RR: 19 (12 Nov 2018 07:30) (18 - 19)  SpO2: 95% (11 Nov 2018 21:25) (95% - 98%)    11-11-18 @ 07:01  -  11-12-18 @ 07:00  --------------------------------------------------------  IN: 0 mL / OUT: 300 mL / NET: -300 mL      PHYSICAL EXAM:  GENERAL: NAD, well-developed  CHEST/LUNG: Clear to auscultation bilaterally; No wheeze  HEART: Regular rate and rhythm; No murmurs, rubs, or gallops  ABDOMEN: Soft, Nontender, Nondistended; Bowel sounds present  EXTREMITIES:  2+ Peripheral Pulses, No clubbing, cyanosis, or edema  PSYCH: AAOx3  NEUROLOGY: non-focal  SKIN: No rashes or lesions    ===================== LABS =====================                        15.0   6.09  )-----------( 141      ( 12 Nov 2018 05:34 )             43.1     11-12    141  |  99  |  21<H>  ----------------------------<  133<H>  3.9   |  23  |  0.8    Ca    9.0      12 Nov 2018 05:34  Mg     2.1     11-12    TPro  7.3  /  Alb  5.0  /  TBili  0.8  /  DBili  x   /  AST  21  /  ALT  51<H>  /  AlkPhos  45  11-11      ================== IMAGING TO DATE ==================    < from: Xray Hip 2-3 Views, Right (11.11.18 @ 16:18) >  No evidence of acute fracture or dislocation. Mild degenerative changes   of the right hip. 2 mm radiodense fragment is noted to overlie the right   medial thigh soft tissues, which may be external to the patient.     < end of copied text >      ================== ALLERGIES ===================  No Known Allergies    ==================== MEDS =====================  atorvastatin 20 milliGRAM(s) Oral at bedtime  buPROPion XL . 300 milliGRAM(s) Oral daily  chlorhexidine 4% Liquid 1 Application(s) Topical <User Schedule>  diltiazem    milliGRAM(s) Oral daily  enoxaparin Injectable 40 milliGRAM(s) SubCutaneous every 24 hours  folic acid 1 milliGRAM(s) Oral <User Schedule>  influenza   Vaccine 0.5 milliLiter(s) IntraMuscular once  levothyroxine 75 MICROGram(s) Oral daily  pantoprazole    Tablet 40 milliGRAM(s) Oral before breakfast  predniSONE   Tablet 5 milliGRAM(s) Oral daily    PRN MEDICATIONS  cyclobenzaprine 10 milliGRAM(s) Oral three times a day PRN  ketorolac   Injectable 15 milliGRAM(s) IV Push every 8 hours PRN    ================= ASSESS/PLAN ==================  Patient is a 58y old Male who presents with a chief complaint of Right groin pain (12 Nov 2018 09:32)  Currently admitted to medicine with the primary diagnosis of Groin strain      PLAN  A 58y Male with a PMH of prediabetes, DLD, A-Fib (no AC), RA, and hyperthyroidism s/p GORDON who presented with a 1-day history of right groin pain.     Plan:  #Right groin pain  - DDx include muscle/tendon strain vs. trochanteric bursitis vs. hip OA  - x-ray of hip: No evidence of acute fracture or dislocation. Mild degenerative changes of the right hip.   - LE venous duplex negative for DVT  - continue bedrest for now; apply ice to affected area  - continue pain control as tolerated  - PT/rehab consult pending  - Ortho consult placed, in the meantime ordered a pelvic CT non contrast     #Prediabetes  - hold oral hypoglycemic medications  - monitor FS and start insulin if FS persistently >180    #Paroxysmal A-Fib  - patient denies being on anticoagulants or antiplatelets  - continue rate control    #DLD  - continue statin    #RA  - patient on daily Prednisone and weekly MTX and Actembra  - continue Folic acid  - f/u with rheumatology as outpatient    #Hyperthyroidism s/p GORDON  - continue Synthroid    GI PPX: Pantoprozole   DVT PPX: Lovenox    DIET:  () Regular  /  (X) Cardiac  / () Renal  /  () Diabetic  /  () Fluid restriction  /   () NPO  ACTIVITY:  () Ad Alissa  /  (X) Advance as Tolerated  /  () Bed Rest  /  () Fall Precaution  /  () Seizure precaution    ================= PRESENT TODAY ==================    1-Kong Catheter: No  Indication:  2-IV access:      A)Peripheral: Yes      Indication: IV medications       B)Midline: No      Indication      C)PICC line: No      Indication:      D)Central Line: No       Indication:   3-IV Fluids: No  Indication:  4-Oxygen: Room Air Sat:     ================= DISPOSITION ==================    Patient to be discharged when condition(s) optimized.            Discharge to:  (X) Home  /  () SNF  /  () Hospice  /  () Other    ================= CODE STATUS =================                  (X) FULL CODE     |     () DNR     |     () DNI

## 2018-11-12 NOTE — CONSULT NOTE ADULT - ASSESSMENT
IMPRESSION: Rehab of gait dysfunction / right groin pain    PRECAUTIONS: [  ] Cardiac  [  ] Respiratory  [  ] Seizures [  ] Contact Isolation  [  ] Droplet Isolation  [  ] Other    Weight Bearing Status:     RECOMMENDATION:    Out of Bed to Chair     DVT/Decubiti Prophylaxis    REHAB PLAN:     [  x ] Bedside P/T 3-5 times a week   [   ]   Bedside O/T  2-3 times a week             [   ] No Rehab Therapy Indicated                   [   ]  Speech Therapy   Conditioning/ROM                                    ADL  Bed Mobility                                               Conditioning/ROM  Transfers                                                     Bed Mobility  Sitting /Standing Balance                         Transfers                                        Gait Training                                               Sitting/Standing Balance  Stair Training [   ]Applicable                    Home equipment Eval                                                                        Splinting  [   ] Only      GOALS:   ADL   [   ]   Independent                    Transfers  [ x  ] Independent                          Ambulation  [ x  ] Independent     [ x   ] With device                            [   ]  CG                                                         [   ]  CG                                                                  [   ] CG                            [    ] Min A                                                   [   ] Min A                                                              [   ] Min  A          DISCHARGE PLAN:   [   ]  Good candidate for Intensive Rehabilitation/Hospital based                                             Will tolerate 3hrs Intensive Rehab Daily                                       [ x   ]  Short Term Rehab in Skilled Nursing Facility                                vs       [ x   ]  Home with Outpatient or VN services                                         [    ]  Possible Candidate for Intensive Hospital based Rehab

## 2018-11-13 ENCOUNTER — TRANSCRIPTION ENCOUNTER (OUTPATIENT)
Age: 58
End: 2018-11-13

## 2018-11-13 VITALS — TEMPERATURE: 98 F | HEART RATE: 77 BPM | SYSTOLIC BLOOD PRESSURE: 132 MMHG | DIASTOLIC BLOOD PRESSURE: 65 MMHG

## 2018-11-13 LAB
ESTIMATED AVERAGE GLUCOSE: 189 MG/DL — HIGH (ref 68–114)
GLUCOSE BLDC GLUCOMTR-MCNC: 198 MG/DL — HIGH (ref 70–99)
GLUCOSE BLDC GLUCOMTR-MCNC: 269 MG/DL — HIGH (ref 70–99)
HBA1C BLD-MCNC: 8.2 % — HIGH (ref 4–5.6)
HBV CORE AB SER-ACNC: SIGNIFICANT CHANGE UP
HBV CORE IGM SER-ACNC: SIGNIFICANT CHANGE UP
HBV SURFACE AB SER-ACNC: SIGNIFICANT CHANGE UP
HBV SURFACE AG SER-ACNC: SIGNIFICANT CHANGE UP
HCV AB S/CO SERPL IA: 0.12 S/CO — SIGNIFICANT CHANGE UP
HCV AB SERPL-IMP: SIGNIFICANT CHANGE UP

## 2018-11-13 RX ADMIN — PANTOPRAZOLE SODIUM 40 MILLIGRAM(S): 20 TABLET, DELAYED RELEASE ORAL at 08:40

## 2018-11-13 RX ADMIN — Medication 360 MILLIGRAM(S): at 05:59

## 2018-11-13 RX ADMIN — Medication 5 UNIT(S): at 08:42

## 2018-11-13 RX ADMIN — Medication 5 MILLIGRAM(S): at 05:58

## 2018-11-13 RX ADMIN — Medication 5 UNIT(S): at 12:36

## 2018-11-13 RX ADMIN — Medication 6: at 12:35

## 2018-11-13 RX ADMIN — Medication 15 MILLIGRAM(S): at 05:57

## 2018-11-13 RX ADMIN — BUPROPION HYDROCHLORIDE 300 MILLIGRAM(S): 150 TABLET, EXTENDED RELEASE ORAL at 12:38

## 2018-11-13 RX ADMIN — Medication 1 MILLIGRAM(S): at 05:58

## 2018-11-13 RX ADMIN — Medication 75 MICROGRAM(S): at 05:58

## 2018-11-13 RX ADMIN — Medication 2: at 08:42

## 2018-11-13 RX ADMIN — INFLUENZA VIRUS VACCINE 0.5 MILLILITER(S): 15; 15; 15; 15 SUSPENSION INTRAMUSCULAR at 15:28

## 2018-11-13 NOTE — DISCHARGE NOTE ADULT - PROVIDER TOKENS
TOKJEWEL:'[id number]',CARLOS:'[id number]' CARLOS:'[id number]',CARLOS:'[id number]'

## 2018-11-13 NOTE — PHYSICAL THERAPY INITIAL EVALUATION ADULT - GENERAL OBSERVATIONS, REHAB EVAL
[id number] am Chart reviewed. Pt. seen seated at edge of bed; in DEBBIE, c/o right hip/groin pain but verbalized improving due to intake of Tramadol.

## 2018-11-13 NOTE — DISCHARGE NOTE ADULT - MEDICATION SUMMARY - MEDICATIONS TO TAKE
I will START or STAY ON the medications listed below when I get home from the hospital:    predniSONE  -- 5 milligram(s) by mouth once a day  -- Indication: For Rheumatoid Arthritis     Cardizem  -- 360 milligram(s) by mouth once a day  -- Indication: For Atrial fibrillation     Synjardy 12.5 mg-500 mg oral tablet  -- 1 tab(s) by mouth 2 times a day  -- Indication: For Diabetes     Lipitor  -- orally once a day  -- Indication: For Hyperlipidemia     methotrexate  -- Indication: For Rheumatoid Arthritis     Actemra 162 mg/0.9 mL subcutaneous solution  -- subcutaneous once a week  -- Indication: For Rheumatoid Arthritis     NexIUM  -- orally once a day  -- Indication: For Gastric Protection     Wellbutrin  -- 300 milligram(s) by mouth once a day  -- Indication: For Smoking Cessation     Synthroid 75 mcg (0.075 mg) oral tablet  -- 1 tab(s) by mouth once a day  -- Indication: For Hypothyroidism     folic acid 1 mg oral tablet  -- 1 tab(s) by mouth 2 times a day  -- Indication: For Health Maintenance

## 2018-11-13 NOTE — DISCHARGE NOTE ADULT - CARE PLAN
Principal Discharge DX:	Groin strain  Goal:	Resolution  Assessment and plan of treatment:	The right groin and hip pain you had are likely the result of muscle strain due to the mechanical strain.   A CT scan done showed some degenerative bone disease but did not show any fractures.   There is some fluid around the right hip however no intervention as required at the moment.   Please follow up with your primary care physician and orthopedics as an outpatient  Secondary Diagnosis:	Atrial fibrillation  Goal:	Stability  Assessment and plan of treatment:	Stable. Continue taking the Cardizem as prescribed and follow up with your primary care physician and cardiologist after discharge  Secondary Diagnosis:	Diabetes  Goal:	Stability  Assessment and plan of treatment:	Stable. Continue taking the medications as prescribed and follow up with your primary care physician and after discharge  Secondary Diagnosis:	Rheumatoid arthritis  Goal:	Stability  Assessment and plan of treatment:	Stable.  Continue taking the medications as prescribed and follow up with your primary care physician and after discharge Principal Discharge DX:	Groin strain  Goal:	Resolution  Assessment and plan of treatment:	The right groin and hip pain you had are likely the result of muscle strain due to the mechanical strain.   A CT scan done showed some degenerative bone disease but did not show any fractures.   There is some fluid around the right hip however no intervention as required at the moment.   Please follow up with your primary care physician and orthopedics as an outpatient  Secondary Diagnosis:	Atrial fibrillation  Goal:	Stability  Assessment and plan of treatment:	Stable. Continue taking the Cardizem as prescribed and follow up with your primary care physician and cardiologist after discharge  Secondary Diagnosis:	Diabetes  Goal:	Stability  Assessment and plan of treatment:	Stable. Continue taking the medications as prescribed and follow up with your primary care physician and after discharge  Follow your blood sugars at home daily in the morning, before going to bed and before and after meals. Try to keep a target glucose around 100  Secondary Diagnosis:	Rheumatoid arthritis  Goal:	Stability  Assessment and plan of treatment:	Stable.  Continue taking the medications as prescribed and follow up with your primary care physician and after discharge Principal Discharge DX:	Groin strain  Goal:	Resolution  Assessment and plan of treatment:	The right groin and hip pain you had are likely the result of muscle strain due to the mechanical strain.   A CT scan done showed some degenerative bone disease but did not show any fractures.   There is some fluid around the right hip however no intervention as required at the moment.   Please follow up with your primary care physician and orthopedics as an outpatient as you may need an MRI of the hip  Secondary Diagnosis:	Atrial fibrillation  Goal:	Stability  Assessment and plan of treatment:	Stable. Continue taking the Cardizem as prescribed and follow up with your primary care physician and cardiologist after discharge  Secondary Diagnosis:	Diabetes  Goal:	Stability  Assessment and plan of treatment:	Stable. Continue taking the medications as prescribed and follow up with your primary care physician and after discharge  Follow your blood sugars at home daily in the morning, before going to bed and before and after meals. Try to keep a target glucose around 100  Secondary Diagnosis:	Rheumatoid arthritis  Goal:	Stability  Assessment and plan of treatment:	Stable.  Continue taking the medications as prescribed and follow up with your primary care physician and after discharge

## 2018-11-13 NOTE — PROGRESS NOTE ADULT - SUBJECTIVE AND OBJECTIVE BOX
Patient was seen and examined. Spoke with RN. Chart reviewed.  still with pain in right hip  case discussed with ho/son    No events overnight.  Vital Signs Last 24 Hrs  T(F): 96.8 (13 Nov 2018 07:15), Max: 99.1 (12 Nov 2018 15:45)  HR: 68 (13 Nov 2018 07:15) (67 - 70)  BP: 115/59 (13 Nov 2018 07:15) (115/59 - 126/64)  SpO2: --  MEDICATIONS  (STANDING):  atorvastatin 20 milliGRAM(s) Oral at bedtime  buPROPion XL . 300 milliGRAM(s) Oral daily  chlorhexidine 4% Liquid 1 Application(s) Topical <User Schedule>  dextrose 5%. 1000 milliLiter(s) (50 mL/Hr) IV Continuous <Continuous>  dextrose 50% Injectable 12.5 Gram(s) IV Push once  dextrose 50% Injectable 25 Gram(s) IV Push once  dextrose 50% Injectable 25 Gram(s) IV Push once  diltiazem    milliGRAM(s) Oral daily  enoxaparin Injectable 40 milliGRAM(s) SubCutaneous every 24 hours  folic acid 1 milliGRAM(s) Oral <User Schedule>  influenza   Vaccine 0.5 milliLiter(s) IntraMuscular once  insulin glargine Injectable (LANTUS) 15 Unit(s) SubCutaneous at bedtime  insulin lispro (HumaLOG) corrective regimen sliding scale   SubCutaneous three times a day before meals  insulin lispro Injectable (HumaLOG) 5 Unit(s) SubCutaneous three times a day before meals  levothyroxine 75 MICROGram(s) Oral daily  pantoprazole    Tablet 40 milliGRAM(s) Oral before breakfast  predniSONE   Tablet 5 milliGRAM(s) Oral daily    MEDICATIONS  (PRN):  cyclobenzaprine 10 milliGRAM(s) Oral three times a day PRN Muscle Spasm  dextrose 40% Gel 15 Gram(s) Oral once PRN Blood Glucose LESS THAN 70 milliGRAM(s)/deciliter  glucagon  Injectable 1 milliGRAM(s) IntraMuscular once PRN Glucose LESS THAN 70 milligrams/deciliter  ketorolac   Injectable 15 milliGRAM(s) IV Push every 8 hours PRN Severe Pain (7 - 10)    Labs:                        15.0   6.09  )-----------( 141      ( 12 Nov 2018 05:34 )             43.1                         16.8   8.95  )-----------( 181      ( 11 Nov 2018 19:20 )             46.1     12 Nov 2018 05:34    141    |  99     |  21     ----------------------------<  133    3.9     |  23     |  0.8    11 Nov 2018 19:20    138    |  94     |  18     ----------------------------<  112    3.8     |  24     |  0.8      Ca    9.0        12 Nov 2018 05:34  Ca    9.8        11 Nov 2018 19:20  Mg     2.1       12 Nov 2018 05:34    TPro  7.3    /  Alb  5.0    /  TBili  0.8    /  DBili  x      /  AST  21     /  ALT  51     /  AlkPhos  45     11 Nov 2018 19:20            Radiology:    General: comfortable, NAD  Neurology: A&Ox3, nonfocal  Head:  Normocephalic, atraumatic  ENT:  Mucosa moist, no ulcerations  Neck:  Supple, no JVD,   Skin: no breakdowns  Resp: CTA B/L  CV: RRR, S1S2,   GI: Soft, NT, bowel sounds  MS: No edema, + peripheral pulses, FROM all 4 extremity

## 2018-11-13 NOTE — PROGRESS NOTE ADULT - ASSESSMENT
________________________________________________________________________________  DAILY PROGRESS NOTE:    ================== SUBJECTIVE ==================    NIKOLAY CLEVELAND  /   58y  /  Male  /  MRN#: [id number]  Patient is a 58y old Male who presents with a chief complaint of Right groin pain (12 Nov 2018 15:50)  Currently admitted to medicine with the primary diagnosis of Groin strain      HOSPITAL DAY: 2d     SUMMARY OF HOSPITAL COURSE TO DATE     OVERNIGHT EVENTS:     TODAY: Patient was seen this morning at bedside. Currently, the patient reports ....    Review of systems is otherwise negative.    =================== OBJECTIVE ===================    VITAL SIGNS: Last 24 Hours  T(C): 36 (13 Nov 2018 07:15), Max: 37.3 (12 Nov 2018 15:45)  T(F): 96.8 (13 Nov 2018 07:15), Max: 99.1 (12 Nov 2018 15:45)  HR: 68 (13 Nov 2018 07:15) (67 - 70)  BP: 115/59 (13 Nov 2018 07:15) (115/59 - 126/64)  BP(mean): --  RR: 19 (13 Nov 2018 07:15) (18 - 19)  SpO2: --    PHYSICAL EXAM:  GENERAL: NAD, well-developed  CHEST/LUNG: Clear to auscultation bilaterally; No wheeze  HEART: Regular rate and rhythm; No murmurs, rubs, or gallops  ABDOMEN: Soft, Nontender, Nondistended; Bowel sounds present  EXTREMITIES:  2+ Peripheral Pulses, No clubbing, cyanosis, or edema  PSYCH: AAOx3  NEUROLOGY: non-focal  SKIN: No rashes or lesions    ===================== LABS =====================                        15.0   6.09  )-----------( 141      ( 12 Nov 2018 05:34 )             43.1     11-12    141  |  99  |  21<H>  ----------------------------<  133<H>  3.9   |  23  |  0.8    Ca    9.0      12 Nov 2018 05:34  Mg     2.1     11-12    TPro  7.3  /  Alb  5.0  /  TBili  0.8  /  DBili  x   /  AST  21  /  ALT  51<H>  /  AlkPhos  45  11-11    ================== ALLERGIES ===================  No Known Allergies    ==================== MEDS =====================  atorvastatin 20 milliGRAM(s) Oral at bedtime  buPROPion XL . 300 milliGRAM(s) Oral daily  chlorhexidine 4% Liquid 1 Application(s) Topical <User Schedule>  dextrose 5%. 1000 milliLiter(s) IV Continuous <Continuous>  dextrose 50% Injectable 12.5 Gram(s) IV Push once  dextrose 50% Injectable 25 Gram(s) IV Push once  dextrose 50% Injectable 25 Gram(s) IV Push once  diltiazem    milliGRAM(s) Oral daily  enoxaparin Injectable 40 milliGRAM(s) SubCutaneous every 24 hours  folic acid 1 milliGRAM(s) Oral <User Schedule>  influenza   Vaccine 0.5 milliLiter(s) IntraMuscular once  insulin glargine Injectable (LANTUS) 15 Unit(s) SubCutaneous at bedtime  insulin lispro (HumaLOG) corrective regimen sliding scale   SubCutaneous three times a day before meals  insulin lispro Injectable (HumaLOG) 5 Unit(s) SubCutaneous three times a day before meals  levothyroxine 75 MICROGram(s) Oral daily  pantoprazole    Tablet 40 milliGRAM(s) Oral before breakfast  predniSONE   Tablet 5 milliGRAM(s) Oral daily    PRN MEDICATIONS  cyclobenzaprine 10 milliGRAM(s) Oral three times a day PRN  dextrose 40% Gel 15 Gram(s) Oral once PRN  glucagon  Injectable 1 milliGRAM(s) IntraMuscular once PRN  ketorolac   Injectable 15 milliGRAM(s) IV Push every 8 hours PRN    ================= ASSESS/PLAN ==================  Patient is a 58y old Male who presents with a chief complaint of Right groin pain (12 Nov 2018 09:32)  Currently admitted to medicine with the primary diagnosis of Groin strain      PLAN  A 58y Male with a PMH of prediabetes, DLD, A-Fib (no AC), RA, and hyperthyroidism s/p GORDON who presented with a 1-day history of right groin pain.     Plan:  #Right groin pain  - DDx include muscle/tendon strain vs. trochanteric bursitis vs. hip OA  - x-ray of hip: No evidence of acute fracture or dislocation. Mild degenerative changes of the right hip.   - LE venous duplex negative for DVT  - continue bedrest for now; apply ice to affected area  - continue pain control as tolerated  - PT/rehab consult pending  - CT scan shows DGD and effusion around the right hip but no fractures or hernias. Awaiting ortho recs.     #Prediabetes  - hold oral hypoglycemic medications  - monitor FS and start insulin if FS persistently >180    #Paroxysmal A-Fib  - patient denies being on anticoagulants or antiplatelets  - continue rate control    #DLD  - continue statin    #RA  - patient on daily Prednisone and weekly MTX and Actembra  - continue Folic acid  - f/u with rheumatology as outpatient    #Hyperthyroidism s/p GORDON  - continue Synthroid    GI PPX: Pantoprozole   DVT PPX: Lovenox    DIET:  () Regular  /  (X) Cardiac  / () Renal  /  () Diabetic  /  () Fluid restriction  /   () NPO  ACTIVITY:  () Ad Alissa  /  (X) Advance as Tolerated  /  () Bed Rest  /  () Fall Precaution  /  () Seizure precaution    ================= PRESENT TODAY ==================    1-Kong Catheter: No  Indication:  2-IV access:      A)Peripheral: Yes      Indication: IV medications       B)Midline: No      Indication      C)PICC line: No      Indication:      D)Central Line: No       Indication:   3-IV Fluids: No  Indication:  4-Oxygen: Room Air Sat:     ================= DISPOSITION ==================    Patient to be discharged when condition(s) optimized.            Discharge to:  (X) Home  /  () SNF  /  () Hospice  /  () Other    ================= CODE STATUS =================                  (X) FULL CODE     |     () DNR     |     () DNI ________________________________________________________________________________  DAILY PROGRESS NOTE:    ================== SUBJECTIVE ==================    NIKOLAY CLEVELAND  /   58y  /  Male  /  MRN#: [id number]  Patient is a 58y old Male who presents with a chief complaint of Right groin pain (12 Nov 2018 15:50)  Currently admitted to medicine with the primary diagnosis of Groin strain      HOSPITAL DAY: 2d     OVERNIGHT EVENTS: None    TODAY: Patient was seen this morning at bedside. Currently, the patient reports no complaints     Review of systems is otherwise negative.    =================== OBJECTIVE ===================    VITAL SIGNS: Last 24 Hours  T(C): 36 (13 Nov 2018 07:15), Max: 37.3 (12 Nov 2018 15:45)  T(F): 96.8 (13 Nov 2018 07:15), Max: 99.1 (12 Nov 2018 15:45)  HR: 68 (13 Nov 2018 07:15) (67 - 70)  BP: 115/59 (13 Nov 2018 07:15) (115/59 - 126/64)  BP(mean): --  RR: 19 (13 Nov 2018 07:15) (18 - 19)  SpO2: --    PHYSICAL EXAM:  GENERAL: NAD, well-developed  CHEST/LUNG: Clear to auscultation bilaterally; No wheeze  HEART: Regular rate and rhythm; No murmurs, rubs, or gallops  ABDOMEN: Soft, Nontender, Nondistended; Bowel sounds present  EXTREMITIES:  2+ Peripheral Pulses, No clubbing, cyanosis, or edema  PSYCH: AAOx3  NEUROLOGY: non-focal  SKIN: No rashes or lesions    ===================== LABS =====================                        15.0   6.09  )-----------( 141      ( 12 Nov 2018 05:34 )             43.1     11-12    141  |  99  |  21<H>  ----------------------------<  133<H>  3.9   |  23  |  0.8    Ca    9.0      12 Nov 2018 05:34  Mg     2.1     11-12    TPro  7.3  /  Alb  5.0  /  TBili  0.8  /  DBili  x   /  AST  21  /  ALT  51<H>  /  AlkPhos  45  11-11    ================== ALLERGIES ===================  No Known Allergies    ==================== MEDS =====================  atorvastatin 20 milliGRAM(s) Oral at bedtime  buPROPion XL . 300 milliGRAM(s) Oral daily  chlorhexidine 4% Liquid 1 Application(s) Topical <User Schedule>  dextrose 5%. 1000 milliLiter(s) IV Continuous <Continuous>  dextrose 50% Injectable 12.5 Gram(s) IV Push once  dextrose 50% Injectable 25 Gram(s) IV Push once  dextrose 50% Injectable 25 Gram(s) IV Push once  diltiazem    milliGRAM(s) Oral daily  enoxaparin Injectable 40 milliGRAM(s) SubCutaneous every 24 hours  folic acid 1 milliGRAM(s) Oral <User Schedule>  influenza   Vaccine 0.5 milliLiter(s) IntraMuscular once  insulin glargine Injectable (LANTUS) 15 Unit(s) SubCutaneous at bedtime  insulin lispro (HumaLOG) corrective regimen sliding scale   SubCutaneous three times a day before meals  insulin lispro Injectable (HumaLOG) 5 Unit(s) SubCutaneous three times a day before meals  levothyroxine 75 MICROGram(s) Oral daily  pantoprazole    Tablet 40 milliGRAM(s) Oral before breakfast  predniSONE   Tablet 5 milliGRAM(s) Oral daily    PRN MEDICATIONS  cyclobenzaprine 10 milliGRAM(s) Oral three times a day PRN  dextrose 40% Gel 15 Gram(s) Oral once PRN  glucagon  Injectable 1 milliGRAM(s) IntraMuscular once PRN  ketorolac   Injectable 15 milliGRAM(s) IV Push every 8 hours PRN    ================= ASSESS/PLAN ==================  Patient is a 58y old Male who presents with a chief complaint of Right groin pain (12 Nov 2018 09:32)  Currently admitted to medicine with the primary diagnosis of Groin strain      PLAN  A 58y Male with a PMH of prediabetes, DLD, A-Fib (no AC), RA, and hyperthyroidism s/p GORDON who presented with a 1-day history of right groin pain.     Plan:  #Right groin pain  - DDx include muscle/tendon strain vs. trochanteric bursitis vs. hip OA  - x-ray of hip: No evidence of acute fracture or dislocation. Mild degenerative changes of the right hip.   - LE venous duplex negative for DVT  - continue bedrest for now; apply ice to affected area  - continue pain control as tolerated  - PT/rehab consult pending  - CT scan shows DGD and effusion around the right hip but no fractures or hernias. Awaiting ortho recs.     #Prediabetes  - hold oral hypoglycemic medications  - monitor FS and start insulin if FS persistently >180    #Paroxysmal A-Fib  - patient denies being on anticoagulants or antiplatelets  - continue rate control    #DLD  - continue statin    #RA  - patient on daily Prednisone and weekly MTX and Actembra  - continue Folic acid  - f/u with rheumatology as outpatient    #Hyperthyroidism s/p GORDON  - continue Synthroid    GI PPX: Pantoprozole   DVT PPX: Lovenox    DIET:  () Regular  /  (X) Cardiac  / () Renal  /  () Diabetic  /  () Fluid restriction  /   () NPO  ACTIVITY:  () Ad Alissa  /  (X) Advance as Tolerated  /  () Bed Rest  /  () Fall Precaution  /  () Seizure precaution    ================= PRESENT TODAY ==================    1-Kong Catheter: No  Indication:  2-IV access:      A)Peripheral: Yes      Indication: IV medications       B)Midline: No      Indication      C)PICC line: No      Indication:      D)Central Line: No       Indication:   3-IV Fluids: No  Indication:  4-Oxygen: Room Air Sat:     ================= DISPOSITION ==================    Patient to be discharged when condition(s) optimized.            Discharge to:  (X) Home  /  () SNF  /  () Hospice  /  () Other    ================= CODE STATUS =================                  (X) FULL CODE     |     () DNR     |     () DNI

## 2018-11-13 NOTE — DISCHARGE NOTE ADULT - CARE PROVIDERS DIRECT ADDRESSES
,DirectAddress_Unknown,[email or web address] ,DirectAddress_Unknown,[email or web address]

## 2018-11-13 NOTE — DISCHARGE NOTE ADULT - INSTRUCTIONS
Follow a DASH diet: low in salt, low in cholesterol and low in fat but carbohydrate consistent dc home ,continue diabetes meds,check blood sugar level

## 2018-11-13 NOTE — PROGRESS NOTE ADULT - ASSESSMENT
A 58y Male with a PMH of prediabetes, DLD, A-Fib (no AC), RA, and hyperthyroidism s/p GORDON who presented with a 1-day history of right groin pain.     Plan:  #Right groin pain  - DDx include muscle/tendon strain vs. trochanteric bursitis vs. hip OA  - x-ray of hip: No evidence of acute fracture or dislocation. Mild degenerative changes of the right hip.   - LE venous duplex negative for DVT  - continue bedrest for now; apply ice to affected area  - continue pain control as tolerated  - PT/rehab consult pending  - CT scan shows DGD and effusion around the right hip but no fractures or hernias. Awaiting ortho recs.     #Prediabetes  - hold oral hypoglycemic medications  - monitor FS and start insulin if FS persistently >180    #Paroxysmal A-Fib  - patient denies being on anticoagulants or antiplatelets  - continue rate control    #DLD  - continue statin    #RA  - patient on daily Prednisone and weekly MTX and Actembra  - continue Folic acid  - f/u with rheumatology as outpatient    #Hyperthyroidism s/p GORDON  - continue Synthroid    GI PPX: Pantoprozole   DVT PPX: Lovenox

## 2018-11-13 NOTE — DISCHARGE NOTE ADULT - CARE PROVIDER_API CALL
Henri Ledesma (), Infectious Disease; Internal Medicine  [address] [place] [address]  Phone: [phone]  Fax: [phone]    Librado Andersen), Orthopaedic Surgery  [address] [place] [address]  Phone: [phone]  Fax: [phone] Henri Ledesma (), Infectious Disease; Internal Medicine  [address] [place] [address]  Phone: [phone]  Fax: [phone]    Parvez Antonio), Surgery  [address] [place] [address]  Phone: [phone]  Fax: [phone]

## 2018-11-13 NOTE — DISCHARGE NOTE ADULT - NS AS ACTIVITY OBS
Avoid heavy activities for the next week to 10 days however progressively return to your regular activities as tolerated

## 2018-11-13 NOTE — DISCHARGE NOTE ADULT - HOSPITAL COURSE
The patient is a 58-year-old male with a PMH of prediabetes, DLD, A-Fib (no AC), RA, and hyperthyroidism s/p GORDON who presented with a 1-day history of right groin pain.  He reports the pain started suddenly while he was trying to walk.  He described the pain as a stabbing sensation and denied any radiation of pain.  He was able to ambulate despite experiencing pain when trying to do so.  Prior to onset of pain, the patient reports visiting a wooded area but denied sustaining any trauma.  Otherwise, he denied fever, chills, fall, abdominal pain, difficulty urinating or with bowel movement.  In the ED he was treated with pain medications and muscle relaxers.  However, he was unable to ambulate secondary to severe pain on lateral motion of the right leg.    A xray done was negative for fracture and CT done showed degenerative disease, fluid around the right hip and no fractures.   Patient was seen by orthopedics and will follow up as an outpatient. Is medically cleared for discharge as he improved after a course of NSAIDs and is tolerating ambulation The patient is a 58-year-old male with a PMH of prediabetes, DLD, A-Fib (no AC), RA, and hyperthyroidism s/p GORDON who presented with a 1-day history of right groin pain.  He reports the pain started suddenly while he was trying to walk.  He described the pain as a stabbing sensation and denied any radiation of pain.  He was able to ambulate despite experiencing pain when trying to do so.  Prior to onset of pain, the patient reports visiting a wooded area but denied sustaining any trauma.  Otherwise, he denied fever, chills, fall, abdominal pain, difficulty urinating or with bowel movement.  In the ED he was treated with pain medications and muscle relaxers.  However, he was unable to ambulate secondary to severe pain on lateral motion of the right leg.    A xray done was negative for fracture and CT done showed degenerative disease, fluid around the right hip and no fractures.   Patient was seen by orthopedics and will follow up as an outpatient. Discussed the case this morning with attending who also spoke with the family members, and the patient is medically cleared for discharge as he improved after a course of NSAIDs and is tolerating ambulation with crushes. He will follow up with ortho and PMD as outpatient for possible MRI

## 2018-11-13 NOTE — DISCHARGE NOTE ADULT - PLAN OF CARE
Resolution The right groin and hip pain you had are likely the result of muscle strain due to the mechanical strain.   A CT scan done showed some degenerative bone disease but did not show any fractures.   There is some fluid around the right hip however no intervention as required at the moment.   Please follow up with your primary care physician and orthopedics as an outpatient Stability Stable. Continue taking the Cardizem as prescribed and follow up with your primary care physician and cardiologist after discharge Stable. Continue taking the medications as prescribed and follow up with your primary care physician and after discharge Stable.  Continue taking the medications as prescribed and follow up with your primary care physician and after discharge Stable. Continue taking the medications as prescribed and follow up with your primary care physician and after discharge  Follow your blood sugars at home daily in the morning, before going to bed and before and after meals. Try to keep a target glucose around 100 The right groin and hip pain you had are likely the result of muscle strain due to the mechanical strain.   A CT scan done showed some degenerative bone disease but did not show any fractures.   There is some fluid around the right hip however no intervention as required at the moment.   Please follow up with your primary care physician and orthopedics as an outpatient as you may need an MRI of the hip

## 2018-11-13 NOTE — DISCHARGE NOTE ADULT - PATIENT PORTAL LINK FT
You can access the [facility] Patient Portal, offered by [facility], by registering with the following website: [email or web address]

## 2019-01-29 PROBLEM — Z00.00 ENCOUNTER FOR PREVENTIVE HEALTH EXAMINATION: Status: ACTIVE | Noted: 2019-01-29

## 2019-01-31 ENCOUNTER — APPOINTMENT (OUTPATIENT)
Dept: CARDIOLOGY | Facility: CLINIC | Age: 59
End: 2019-01-31

## 2019-01-31 VITALS
HEIGHT: 75 IN | HEART RATE: 75 BPM | BODY MASS INDEX: 39.17 KG/M2 | DIASTOLIC BLOOD PRESSURE: 82 MMHG | SYSTOLIC BLOOD PRESSURE: 144 MMHG | WEIGHT: 315 LBS

## 2019-01-31 DIAGNOSIS — Z78.9 OTHER SPECIFIED HEALTH STATUS: ICD-10-CM

## 2019-01-31 DIAGNOSIS — Z87.891 PERSONAL HISTORY OF NICOTINE DEPENDENCE: ICD-10-CM

## 2019-01-31 NOTE — PHYSICAL EXAM
[General Appearance - Well Developed] : well developed [Normal Appearance] : normal appearance [Well Groomed] : well groomed [General Appearance - Well Nourished] : well nourished [No Deformities] : no deformities [General Appearance - In No Acute Distress] : no acute distress [Normal Conjunctiva] : the conjunctiva exhibited no abnormalities [Eyelids - No Xanthelasma] : the eyelids demonstrated no xanthelasmas [Normal Oral Mucosa] : normal oral mucosa [No Oral Pallor] : no oral pallor [No Oral Cyanosis] : no oral cyanosis [Normal Jugular Venous A Waves Present] : normal jugular venous A waves present [Normal Jugular Venous V Waves Present] : normal jugular venous V waves present [No Jugular Venous Ortiz A Waves] : no jugular venous ortiz A waves [Respiration, Rhythm And Depth] : normal respiratory rhythm and effort [Exaggerated Use Of Accessory Muscles For Inspiration] : no accessory muscle use [Auscultation Breath Sounds / Voice Sounds] : lungs were clear to auscultation bilaterally [Heart Rate And Rhythm] : heart rate and rhythm were normal [Heart Sounds] : normal S1 and S2 [Murmurs] : no murmurs present [Abdomen Soft] : soft [Abdomen Tenderness] : non-tender [Abdomen Mass (___ Cm)] : no abdominal mass palpated [Abnormal Walk] : normal gait [Gait - Sufficient For Exercise Testing] : the gait was sufficient for exercise testing [Nail Clubbing] : no clubbing of the fingernails [Cyanosis, Localized] : no localized cyanosis [Petechial Hemorrhages (___cm)] : no petechial hemorrhages [Skin Color & Pigmentation] : normal skin color and pigmentation [] : no rash [No Venous Stasis] : no venous stasis [Skin Lesions] : no skin lesions [No Skin Ulcers] : no skin ulcer [No Xanthoma] : no  xanthoma was observed [Oriented To Time, Place, And Person] : oriented to person, place, and time [Affect] : the affect was normal [Mood] : the mood was normal [No Anxiety] : not feeling anxious

## 2019-02-05 ENCOUNTER — FORM ENCOUNTER (OUTPATIENT)
Age: 59
End: 2019-02-05

## 2019-02-06 ENCOUNTER — OUTPATIENT (OUTPATIENT)
Dept: OUTPATIENT SERVICES | Facility: HOSPITAL | Age: 59
LOS: 1 days | Discharge: HOME | End: 2019-02-06

## 2019-02-06 DIAGNOSIS — Z98.890 OTHER SPECIFIED POSTPROCEDURAL STATES: Chronic | ICD-10-CM

## 2019-02-06 DIAGNOSIS — I10 ESSENTIAL (PRIMARY) HYPERTENSION: ICD-10-CM

## 2019-02-07 ENCOUNTER — APPOINTMENT (OUTPATIENT)
Dept: CARDIOLOGY | Facility: CLINIC | Age: 59
End: 2019-02-07

## 2019-02-11 ENCOUNTER — APPOINTMENT (OUTPATIENT)
Dept: CARDIOLOGY | Facility: CLINIC | Age: 59
End: 2019-02-11

## 2019-02-14 NOTE — DISCUSSION/SUMMARY
[FreeTextEntry1] : patient is cleared for bariatric surgery\par no further cardiac work up warranted\par no evidence of any active cv problems\par low risk surgery

## 2019-02-14 NOTE — HISTORY OF PRESENT ILLNESS
[FreeTextEntry1] : pre-op for bariatric surgery\par patient denies h/o exertional chest pain or dyspnea\par  no evidence of cad or ashd\par denies h/o angina, mi etc.\par risk factors for cad are htn and obesity\par  no h/o valvular or hypertensive heart disease\par no evidence of peripheral vascular disease, claudication, tia or cva\par denies exertional chest pain or sob\par no evidence of pvd, cva or tia\par denies palps or arrythmias\par no pre-syncope or syncope\par \par 2d echo done on 2/7/2019 was normal. ef of 55-60%\par nuclear exam was normal

## 2019-02-14 NOTE — REASON FOR VISIT
[Initial Evaluation] : an initial evaluation of [FreeTextEntry2] : pre-op for bariatric surgery at [facility]

## 2021-05-18 ENCOUNTER — APPOINTMENT (OUTPATIENT)
Dept: CARDIOLOGY | Facility: CLINIC | Age: 61
End: 2021-05-18
Payer: COMMERCIAL

## 2021-05-18 VITALS
TEMPERATURE: 97.7 F | DIASTOLIC BLOOD PRESSURE: 78 MMHG | WEIGHT: 289 LBS | SYSTOLIC BLOOD PRESSURE: 136 MMHG | BODY MASS INDEX: 35.93 KG/M2 | HEIGHT: 75 IN | HEART RATE: 57 BPM

## 2021-05-18 DIAGNOSIS — R73.03 PREDIABETES.: ICD-10-CM

## 2021-05-18 DIAGNOSIS — E03.9 HYPOTHYROIDISM, UNSPECIFIED: ICD-10-CM

## 2021-05-18 PROCEDURE — 99214 OFFICE O/P EST MOD 30 MIN: CPT

## 2021-05-18 PROCEDURE — 93000 ELECTROCARDIOGRAM COMPLETE: CPT

## 2021-05-18 PROCEDURE — 99072 ADDL SUPL MATRL&STAF TM PHE: CPT

## 2021-05-18 RX ORDER — FOLIC ACID 1 MG/1
1 TABLET ORAL TWICE DAILY
Refills: 0 | Status: DISCONTINUED | COMMUNITY
End: 2021-05-18

## 2021-05-18 NOTE — HISTORY OF PRESENT ILLNESS
[FreeTextEntry1] : pre-op for bariatric surgery\par patient denies h/o exertional chest pain or dyspnea\par  no evidence of cad or ashd\par denies h/o angina, mi etc.\par risk factors for cad are htn and obesity\par  no h/o valvular or hypertensive heart disease\par no evidence of peripheral vascular disease, claudication, tia or cva\par denies exertional chest pain or sob\par no evidence of pvd, cva or tia\par denies palps or arrythmias\par no pre-syncope or syncope\par \par 2d echo done on 2/7/2019 was normal. ef of 55-60%\par nuclear exam was normal\par \par s/p bariatric surgery \par doing well\par no cardiac complaints\par lost 100lbs\par will need echo

## 2021-05-18 NOTE — REASON FOR VISIT
[Initial Evaluation] : an initial evaluation of [FreeTextEntry2] : pre-op for bariatric surgery at [facility]

## 2021-06-22 ENCOUNTER — APPOINTMENT (OUTPATIENT)
Dept: CARDIOLOGY | Facility: CLINIC | Age: 61
End: 2021-06-22
Payer: COMMERCIAL

## 2021-06-22 DIAGNOSIS — Z01.810 ENCOUNTER FOR PREPROCEDURAL CARDIOVASCULAR EXAMINATION: ICD-10-CM

## 2021-06-22 PROCEDURE — 93306 TTE W/DOPPLER COMPLETE: CPT

## 2021-06-22 PROCEDURE — 99072 ADDL SUPL MATRL&STAF TM PHE: CPT

## 2021-06-29 PROBLEM — Z01.810 ENCOUNTER FOR PRE-OPERATIVE CARDIOVASCULAR CLEARANCE: Status: ACTIVE | Noted: 2019-01-31

## 2021-09-14 ENCOUNTER — APPOINTMENT (OUTPATIENT)
Dept: CARDIOLOGY | Facility: CLINIC | Age: 61
End: 2021-09-14
Payer: COMMERCIAL

## 2021-09-14 VITALS
TEMPERATURE: 97.3 F | HEIGHT: 75 IN | DIASTOLIC BLOOD PRESSURE: 64 MMHG | SYSTOLIC BLOOD PRESSURE: 128 MMHG | BODY MASS INDEX: 36.31 KG/M2 | WEIGHT: 292 LBS | HEART RATE: 50 BPM

## 2021-09-14 DIAGNOSIS — I10 ESSENTIAL (PRIMARY) HYPERTENSION: ICD-10-CM

## 2021-09-14 PROCEDURE — 99214 OFFICE O/P EST MOD 30 MIN: CPT

## 2021-09-14 PROCEDURE — 93000 ELECTROCARDIOGRAM COMPLETE: CPT

## 2021-09-14 RX ORDER — MULTIVITAMIN
TABLET ORAL
Refills: 0 | Status: ACTIVE | COMMUNITY

## 2021-09-14 RX ORDER — METHOTREXATE 25 MG/ML
INJECTION, SOLUTION INTRAMUSCULAR; INTRATHECAL; INTRAVENOUS; SUBCUTANEOUS
Refills: 0 | Status: ACTIVE | COMMUNITY

## 2021-09-14 RX ORDER — B-COMPLEX WITH VITAMIN C
TABLET ORAL
Refills: 0 | Status: ACTIVE | COMMUNITY

## 2021-09-14 RX ORDER — LEVOTHYROXINE SODIUM 0.07 MG/1
75 TABLET ORAL DAILY
Qty: 30 | Refills: 0 | Status: ACTIVE | COMMUNITY

## 2021-09-14 RX ORDER — DILTIAZEM HYDROCHLORIDE 360 MG/1
360 TABLET, EXTENDED RELEASE ORAL
Refills: 0 | Status: ACTIVE | COMMUNITY

## 2021-09-14 NOTE — REASON FOR VISIT
[Initial Evaluation] : an initial evaluation of [FreeTextEntry2] : pre-op for bariatric surgery at [facility]

## 2022-01-19 ENCOUNTER — APPOINTMENT (OUTPATIENT)
Dept: CARDIOLOGY | Facility: CLINIC | Age: 62
End: 2022-01-19

## 2022-01-19 ENCOUNTER — EMERGENCY (EMERGENCY)
Facility: HOSPITAL | Age: 62
LOS: 0 days | Discharge: HOME | End: 2022-01-19
Attending: EMERGENCY MEDICINE | Admitting: EMERGENCY MEDICINE
Payer: COMMERCIAL

## 2022-01-19 VITALS
HEIGHT: 75 IN | OXYGEN SATURATION: 99 % | TEMPERATURE: 97 F | HEART RATE: 60 BPM | DIASTOLIC BLOOD PRESSURE: 72 MMHG | RESPIRATION RATE: 18 BRPM | SYSTOLIC BLOOD PRESSURE: 156 MMHG
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
## 2022-01-19 DIAGNOSIS — S01.412A LACERATION WITHOUT FOREIGN BODY OF LEFT CHEEK AND TEMPOROMANDIBULAR AREA, INITIAL ENCOUNTER: ICD-10-CM

## 2022-01-19 DIAGNOSIS — Z23 ENCOUNTER FOR IMMUNIZATION: ICD-10-CM

## 2022-01-19 DIAGNOSIS — I48.91 UNSPECIFIED ATRIAL FIBRILLATION: ICD-10-CM
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
## 2022-01-19 DIAGNOSIS — E11.9 TYPE 2 DIABETES MELLITUS WITHOUT COMPLICATIONS: ICD-10-CM

## 2022-01-19 DIAGNOSIS — Z98.890 OTHER SPECIFIED POSTPROCEDURAL STATES: Chronic | ICD-10-CM

## 2022-01-19 DIAGNOSIS — E78.00 PURE HYPERCHOLESTEROLEMIA, UNSPECIFIED: ICD-10-CM
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
## 2022-01-19 PROCEDURE — 99284 EMERGENCY DEPT VISIT MOD MDM: CPT

## 2022-01-19 RX ORDER — TETANUS TOXOID, REDUCED DIPHTHERIA TOXOID AND ACELLULAR PERTUSSIS VACCINE, ADSORBED 5; 2.5; 8; 8; 2.5 [IU]/.5ML; [IU]/.5ML; UG/.5ML; UG/.5ML; UG/.5ML
0.5 SUSPENSION INTRAMUSCULAR ONCE
Refills: 0 | Status: COMPLETED | OUTPATIENT
Start: 2022-01-19 | End: 2022-01-19

## 2022-01-19 RX ADMIN — TETANUS TOXOID, REDUCED DIPHTHERIA TOXOID AND ACELLULAR PERTUSSIS VACCINE, ADSORBED 0.5 MILLILITER(S): 5; 2.5; 8; 8; 2.5 SUSPENSION INTRAMUSCULAR at 13:24

## 2022-01-19 NOTE — ED PROVIDER NOTE - CARE PROVIDER_API CALL
Galileo Salmeron)  Plastic Surgery  [address] [place] [address]  Phone: [phone]  Fax: [phone]  Scheduled Appointment: 01/24/2022

## 2022-01-19 NOTE — ED PROVIDER NOTE - PATIENT PORTAL LINK FT
You can access the FollowMyHealth Patient Portal offered by [facility] by registering at the following website: [email or web address]. By joining [employer]’s FollowMyHealth portal, you will also be able to view your health information using other applications (apps) compatible with our system.

## 2022-01-19 NOTE — ED ADULT NURSE NOTE - NSICDXFAMILYHX_GEN_ALL_CORE_FT
FAMILY HISTORY:  Father  Still living? Unknown  Family history of coronary artery disease, Age at diagnosis: Age Unknown    Mother  Still living? Unknown  Family history of rheumatoid arthritis, Age at diagnosis: Age Unknown

## 2022-01-19 NOTE — ED PROVIDER NOTE - NS ED ROS FT
Name band;
Review of Systems  Constitutional: (-)head trauma  Eyes/ENT: (-) blurry vision, (-) epistaxis  Cardiovascular: - LOC  Gastrointestinal: (-) vomiting, (-) nausea  Musculoskeletal: (-) neck pain, (-) back pain,   Integumentary: (+) lac  Neurological: (-) headache, (-) altered mental status

## 2022-01-19 NOTE — ED ADULT NURSE NOTE - NSICDXPASTMEDICALHX_GEN_ALL_CORE_FT
PAST MEDICAL HISTORY:  Atrial fibrillation     Diabetes pre diabetic    Hypercholesteremia     Hyperthyroidism     Rheumatoid arthritis

## 2022-01-19 NOTE — ED PROVIDER NOTE - OBJECTIVE STATEMENT
60 yo M presents to meet Dr. Salmeron for laceration repair to left cheek.  Pt states he was carrying a box with metal and cut his cheek. Needs Tetanus.

## 2022-01-19 NOTE — CONSULT NOTE ADULT - SUBJECTIVE AND OBJECTIVE BOX
Plastic: 61 y.o. man struck a piece of metal in his garage sustained a laceration to his cheek.    O/E: Alert. 3.0cm laceration left cheek, beveled. Lido 1%, 1.8cc. COMPLEX repair with debridement, 6-0 vicryl, 6-0 nylon. Dressed. Will check in 5 days. ER to give tetanus.

## 2022-01-19 NOTE — ED PROVIDER NOTE - PHYSICAL EXAMINATION
Vital Signs: I have reviewed the initial vital signs.  Constitutional: WDWN in nad.   Integumentary: + 3 cm laceration to left cheek  HEENT: NC/AT, PERRL  NECK: Supple, non-tender,   Musculoskeletal: FROM,   Neurologic: AAOx3,  good tone, equal strength, steady gait

## 2022-01-24 NOTE — CHART NOTE - NSCHARTNOTEFT_GEN_A_CORE
PACU ANESTHESIA ADMISSION NOTE      Procedure:   Post op diagnosis:      ____  Intubated  TV:______       Rate: ______      FiO2: ______    _x___  Patent Airway    _x___  Full return of protective reflexes    _x___  Full recovery from anesthesia / back to baseline status    Vitals:            T:                BP : 140/67               R:  18            Sat:   95            P:72      Mental Status:  _x___ Awake   _____ Alert   _____ Drowsy   _____ Sedated    Nausea/Vomiting:  _x___  NO       ______Yes,   See Post - Op Orders         Pain Scale (0-10):  __0___    Treatment: _x___ None    ____ See Post - Op/PCA Orders    Post - Operative Fluids:   __x__ Oral   ____ See Post - Op Orders    Plan: Discharge:   _x___Home       _____Floor     _____Critical Care    _____  Other:_________________    Comments:  No anesthesia issues or complications noted.  Discharge when criteria met.
Price (Do Not Change): 0.00
Detail Level: Simple
Instructions: This plan will send the code FBSE to the PM system.  DO NOT or CHANGE the price.

## 2023-05-31 NOTE — PHYSICAL THERAPY INITIAL EVALUATION ADULT - PREDICTED DURATION OF THERAPY (DAYS/WKS), PT EVAL
PT initial evaluation only Adjacent Tissue Transfer Text: The defect edges were debeveled with a #15 scalpel blade.  Given the location of the defect and the proximity to free margins an adjacent tissue transfer was deemed most appropriate.  Using a sterile surgical marker, an appropriate flap was drawn incorporating the defect and placing the expected incisions within the relaxed skin tension lines where possible.    The area thus outlined was incised deep to adipose tissue with a #15 scalpel blade.  The skin margins were undermined to an appropriate distance in all directions utilizing iris scissors.

## 2024-05-22 NOTE — DISCHARGE NOTE ADULT - NS AS DC FU INST LIST INST
Physical Therapy Visit    Visit Type: Daily Treatment Note  Visit: 5  Referring Provider: Madhav Reese MD  Medical Diagnosis (from order): M70.62 - Trochanteric bursitis of left hip     SUBJECTIVE                                                                                                               Patient shares she has been okay. Last night had aching in left thigh which progressed to numbness down the leg. Lasted for about 5-10 minutes. Shares lately she just hasn't felt stable. Feels a lot more stable with the cane.     Feels like hip isn't as tender to the touch as before cortisone shot.     Pain / Symptoms  - Pain rating (out of 10): Current: 2       OBJECTIVE                                                                                                                             Standing Balance  (MARGIE = base of support)  Firm Surface: Single Leg     - Left, Eyes Open (sec): 3             Treatment    Neuromuscular Electrical Stimulation (NMES)  - Muscle Group(s): Left quadriceps - medial distal and lateral proximal pad placement   - Pulse Rate: 38  - On (sec): 10 - Off (sec):  30  - Intensity: patient tolerance  Duration: 10 minutes      Therapeutic Exercise  Nustep L2 for LE mobility / endurance while taking subjective report, 5min     Resisted dorsiflexion with red theraband, x15 each side   Resisted standing terminal knee extension with red theraband, x20 LLE     Adjusted SPC height     Neuromuscular Re-Education  NMES for left quadriceps recruitment - see modalities section for parameters   While performing the following:   -Quad set, 10sec hold x 8   -Short arc quad over red bolster, 10sec hold x 8     Skilled input: verbal instruction/cues, tactile instruction/cues, posture correction, facilitation, inhibition and demonstration    Writer verbally educated and received verbal consent for hand placement, positioning of patient, and techniques to be performed today from patient for clothing  adjustments for techniques, therapist position for techniques and hand placement and palpation for techniques as described above and how they are pertinent to the patient's plan of care.  Home Exercise Program  Access Code: [email or web address]  URL: [email or web address]  Date: 05/01/2024  Prepared by: Rina Baumann    Exercises  - Seated Hip Abduction with Resistance  - 1 x daily - 7 x weekly - 1-2 sets - 10 reps - 10 hold  - Supine Bridge  - 1 x daily - 7 x weekly - 1-2 sets - 10 reps - 10 hold  - Seated Long Arc Quad  - 1 x daily - 7 x weekly - 3 sets - 20 reps  - Seated Ankle Plantarflexion with Resistance  - 1 x daily - 7 x weekly - 3 sets - 20 reps    Patient Education  - Sleep Positions      ASSESSMENT                                                                                                            Trialed NMES to left quad for muscle recruitment with fair tolerance to activity. Most difficulty noted with activation of proximal quadriceps despite stimulation to muscle. Patient also displays difficulty with left resisted dorsiflexion requiring cues to move through entire motion. Patient will continue to benefit from skilled PT for strength and conditioning, pain management, functional mobility, and a gradual increase in load and activity tolerance.     Education:   - Results of above outlined education: Verbalizes understanding and Needs reinforcement    PLAN                                                                                                                           Suggestions for next session as indicated: Progress per plan of care -  NMES to L quad  L ankle strength  Progress to functional strength as able  Gait train / balance        Therapy procedure time and total treatment time can be found documented on the Time Entry flowsheet     no

## 2024-07-18 NOTE — PHYSICAL EXAM
----- Message from Any Cummings DO sent at 2024  2:03 PM CDT -----  Regarding: Order for AZALEA PEREZ    Patient Name: AZALEA PEREZ([id number])  Sex: Female  : 1960      PCP: JESSIE CHILDERS    Center: [facility]     Types of orders made on 2024: Outpatient Referral, Procedure Request    Order Date:2024  Ordering User:ANY CUMMINGS [[id number]]  Encounter Provider:Any Cummings DO [[id number]]  Authorizing Provider: Any Cummings DO [[id number]]  Department:[facility]IN [facility]NAGEMENT[[id number]]    Common Order Information  Procedure -> Other (Specify location and laterality) Cmt: Left T3, T4, T5, T6             intercostal injections with steroid    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 -> Yes         Pre-op Diagnosis -> Interco  stal neuralgia         Facility Name: -> [facility]          [General Appearance - Well Developed] : well developed [Normal Appearance] : normal appearance [Well Groomed] : well groomed [General Appearance - Well Nourished] : well nourished [No Deformities] : no deformities [General Appearance - In No Acute Distress] : no acute distress [Normal Conjunctiva] : the conjunctiva exhibited no abnormalities [Eyelids - No Xanthelasma] : the eyelids demonstrated no xanthelasmas [Normal Oral Mucosa] : normal oral mucosa [No Oral Pallor] : no oral pallor [No Oral Cyanosis] : no oral cyanosis [Normal Jugular Venous A Waves Present] : normal jugular venous A waves present [Normal Jugular Venous V Waves Present] : normal jugular venous V waves present [No Jugular Venous Ortiz A Waves] : no jugular venous ortiz A waves [Respiration, Rhythm And Depth] : normal respiratory rhythm and effort [Exaggerated Use Of Accessory Muscles For Inspiration] : no accessory muscle use [Auscultation Breath Sounds / Voice Sounds] : lungs were clear to auscultation bilaterally [Heart Rate And Rhythm] : heart rate and rhythm were normal [Heart Sounds] : normal S1 and S2 [Murmurs] : no murmurs present [Abdomen Soft] : soft [Abdomen Tenderness] : non-tender [Abdomen Mass (___ Cm)] : no abdominal mass palpated [Abnormal Walk] : normal gait [Gait - Sufficient For Exercise Testing] : the gait was sufficient for exercise testing [Nail Clubbing] : no clubbing of the fingernails [Cyanosis, Localized] : no localized cyanosis [Petechial Hemorrhages (___cm)] : no petechial hemorrhages [Skin Color & Pigmentation] : normal skin color and pigmentation [] : no rash [No Venous Stasis] : no venous stasis [Skin Lesions] : no skin lesions [No Skin Ulcers] : no skin ulcer [No Xanthoma] : no  xanthoma was observed [Oriented To Time, Place, And Person] : oriented to person, place, and time [Affect] : the affect was normal [Mood] : the mood was normal [No Anxiety] : not feeling anxious

## 2024-11-04 ENCOUNTER — APPOINTMENT (OUTPATIENT)
Dept: PAIN MANAGEMENT | Facility: CLINIC | Age: 64
End: 2024-11-04
Payer: MEDICARE

## 2024-11-04 DIAGNOSIS — Z87.39 PERSONAL HISTORY OF OTHER DISEASES OF THE MUSCULOSKELETAL SYSTEM AND CONNECTIVE TISSUE: ICD-10-CM

## 2024-11-04 DIAGNOSIS — M54.9 DORSALGIA, UNSPECIFIED: ICD-10-CM

## 2024-11-04 DIAGNOSIS — M70.62 TROCHANTERIC BURSITIS, LEFT HIP: ICD-10-CM

## 2024-11-04 PROCEDURE — 20610 DRAIN/INJ JOINT/BURSA W/O US: CPT | Mod: LT

## 2024-11-04 PROCEDURE — 99204 OFFICE O/P NEW MOD 45 MIN: CPT | Mod: 25

## 2024-11-04 RX ORDER — MELOXICAM 15 MG/1
15 TABLET ORAL DAILY
Qty: 30 | Refills: 0 | Status: ACTIVE | COMMUNITY
Start: 2024-11-04 | End: 1900-01-01

## 2024-12-03 ENCOUNTER — APPOINTMENT (OUTPATIENT)
Dept: PAIN MANAGEMENT | Facility: CLINIC | Age: 64
End: 2024-12-03

## 2024-12-03 DIAGNOSIS — M70.70 OTHER BURSITIS OF HIP, UNSPECIFIED HIP: ICD-10-CM

## 2024-12-03 DIAGNOSIS — M54.9 DORSALGIA, UNSPECIFIED: ICD-10-CM

## 2024-12-03 PROCEDURE — 20552 NJX 1/MLT TRIGGER POINT 1/2: CPT

## 2024-12-03 PROCEDURE — 99213 OFFICE O/P EST LOW 20 MIN: CPT | Mod: 25

## 2024-12-05 ENCOUNTER — APPOINTMENT (OUTPATIENT)
Dept: PAIN MANAGEMENT | Facility: CLINIC | Age: 64
End: 2024-12-05

## 2024-12-12 ENCOUNTER — APPOINTMENT (OUTPATIENT)
Dept: PAIN MANAGEMENT | Facility: CLINIC | Age: 64
End: 2024-12-12
Payer: MEDICARE

## 2024-12-12 DIAGNOSIS — M70.72 OTHER BURSITIS OF HIP, LEFT HIP: ICD-10-CM

## 2024-12-12 PROCEDURE — 77002 NEEDLE LOCALIZATION BY XRAY: CPT

## 2024-12-12 PROCEDURE — 20610 DRAIN/INJ JOINT/BURSA W/O US: CPT | Mod: LT

## 2025-01-07 ENCOUNTER — APPOINTMENT (OUTPATIENT)
Dept: PAIN MANAGEMENT | Facility: CLINIC | Age: 65
End: 2025-01-07
Payer: MEDICARE

## 2025-01-07 DIAGNOSIS — M70.62 TROCHANTERIC BURSITIS, LEFT HIP: ICD-10-CM

## 2025-01-07 DIAGNOSIS — M70.72 OTHER BURSITIS OF HIP, LEFT HIP: ICD-10-CM

## 2025-01-07 PROCEDURE — 99213 OFFICE O/P EST LOW 20 MIN: CPT

## 2025-05-20 ENCOUNTER — RX RENEWAL (OUTPATIENT)
Age: 65
End: 2025-05-20